# Patient Record
Sex: MALE | Race: WHITE | Employment: OTHER | URBAN - NONMETROPOLITAN AREA
[De-identification: names, ages, dates, MRNs, and addresses within clinical notes are randomized per-mention and may not be internally consistent; named-entity substitution may affect disease eponyms.]

---

## 2017-02-06 ENCOUNTER — MYC MEDICAL ADVICE (OUTPATIENT)
Dept: FAMILY MEDICINE | Facility: OTHER | Age: 77
End: 2017-02-06

## 2017-02-20 DIAGNOSIS — F41.9 ANXIETY: ICD-10-CM

## 2017-02-20 RX ORDER — CLONAZEPAM 0.5 MG/1
TABLET ORAL
Qty: 90 TABLET | Refills: 0 | Status: SHIPPED | OUTPATIENT
Start: 2017-02-20 | End: 2017-05-18

## 2017-02-20 NOTE — TELEPHONE ENCOUNTER
Pts wife sent the following message via [place].     Dr. Craig,    Would you please request Clonazepam .5 mg tab for Mookie through [employer].    We are leaving for vacation on March 3rd & the prescription doesn't have a refill until March 1st.    It takes about a week to get here.    Thank you,  Sadie Henderson CMA ([facility])  2/20/2017

## 2017-02-22 ENCOUNTER — MYC MEDICAL ADVICE (OUTPATIENT)
Dept: FAMILY MEDICINE | Facility: OTHER | Age: 77
End: 2017-02-22

## 2017-05-18 DIAGNOSIS — I25.799: ICD-10-CM

## 2017-05-18 DIAGNOSIS — E78.5 HYPERLIPIDEMIA LDL GOAL <100: ICD-10-CM

## 2017-05-18 DIAGNOSIS — Z95.1 POSTSURGICAL AORTOCORONARY BYPASS STATUS: ICD-10-CM

## 2017-05-18 DIAGNOSIS — E11.8 TYPE 2 DIABETES MELLITUS WITH COMPLICATION, UNSPECIFIED LONG TERM INSULIN USE STATUS: ICD-10-CM

## 2017-05-18 DIAGNOSIS — E11.65 TYPE 2 DIABETES MELLITUS WITH HYPERGLYCEMIA, WITHOUT LONG-TERM CURRENT USE OF INSULIN (H): ICD-10-CM

## 2017-05-18 DIAGNOSIS — E11.8 TYPE 2 DIABETES MELLITUS WITH COMPLICATION, WITHOUT LONG-TERM CURRENT USE OF INSULIN (H): ICD-10-CM

## 2017-05-18 DIAGNOSIS — F41.9 ANXIETY: ICD-10-CM

## 2017-05-18 RX ORDER — CLONAZEPAM 0.5 MG/1
TABLET ORAL
Qty: 90 TABLET | Refills: 0 | Status: SHIPPED | OUTPATIENT
Start: 2017-05-18 | End: 2017-09-05

## 2017-05-18 RX ORDER — METOPROLOL TARTRATE 25 MG/1
TABLET, FILM COATED ORAL
Qty: 180 TABLET | Refills: 1 | Status: SHIPPED | OUTPATIENT
Start: 2017-05-18 | End: 2017-11-29

## 2017-05-18 RX ORDER — GLIPIZIDE 5 MG/1
TABLET ORAL
Qty: 90 TABLET | Refills: 1 | Status: SHIPPED | OUTPATIENT
Start: 2017-05-18 | End: 2017-10-05

## 2017-05-18 RX ORDER — SIMVASTATIN 20 MG
TABLET ORAL
Qty: 90 TABLET | Refills: 1 | Status: SHIPPED | OUTPATIENT
Start: 2017-05-18 | End: 2017-11-29

## 2017-05-18 NOTE — TELEPHONE ENCOUNTER
Clonazepam,       Last Written Prescription Date:  2/20/17  Last Fill Quantity: 90,   # refills: 0  Last Office Visit with [facility], [facility] or [facility] prescribing provider: 12/2/16  Future Office visit:    Next 5 appointments (look out 90 days)     Jun 09, 2017  9:40 AM CDT   [facility]    [address] [facility] [address]   [phone]                   Routing refill request to provider for review/approval because:  Drug not on the [facility], [facility] or [facility] refill protocol or controlled substance      Metformin,       Last Written Prescription Date: 12/2/16  Last Fill Quantity: 180, # refills: 1  Last Office Visit with [facility], [facility] or [facility] prescribing provider:  12/2/16   Next 5 appointments (look out 90 days)     Jun 09, 2017  9:40 AM CDT   [facility]   [facility]    [address] [facility] [address]   [phone]                   BP Readings from Last 3 Encounters:   12/02/16 130/62   01/06/16 132/66   11/16/15 124/70     Lab Results   Component Value Date    MICROL 52 11/17/2016     Lab Results   Component Value Date    UMALCR 26.15 11/17/2016     Creatinine   Date Value Ref Range Status   12/29/2015 1.15 mg/dL Final   ]  GFR Estimate   Date Value Ref Range Status   07/30/2015 60 (L) >60 mL/min/1.7m2 Final     Comment:     Non  GFR Calc   09/11/2014 69 >60 mL/min/1.7m2 Final     Comment:     Non  GFR Calc   11/20/2013 67 >60 mL/min/1.7m2 Final     GFR Estimate If Black   Date Value Ref Range Status   07/30/2015 73 >60 mL/min/1.7m2 Final     Comment:      GFR Calc   09/11/2014 83 >60 mL/min/1.7m2 Final     Comment:      GFR Calc   11/20/2013 81 >60 mL/min/1.7m2 Final     Lab Results   Component Value Date    CHOL 156 11/17/2016     Lab Results   Component Value Date    HDL 31 11/17/2016     Lab Results    Component Value Date    LDL 47 11/17/2016     Lab Results   Component Value Date    TRIG 389 11/17/2016     Lab Results   Component Value Date    CHOLHDLRATIO 4.7 09/21/2015     Lab Results   Component Value Date    AST 31 12/29/2015     Lab Results   Component Value Date    ALT 22 12/29/2015     Lab Results   Component Value Date    A1C 6.6 11/17/2016    A1C 7.4 01/06/2016    A1C 6.4 07/30/2015    A1C 8.3 04/08/2015    A1C 7.4 09/11/2014     Potassium   Date Value Ref Range Status   12/29/2015 4.2 mmol/L Final          Metoprolol,      Last Written Prescription Date: 12/2/16  Last Fill Quantity: 180, # refills: 1    Last Office Visit [place] [facility] provider:  12/2/16   Future Office Visit:    Next 5 appointments (look out 90 days)     Jun 09, 2017  9:40 AM CDT   Office Visit with Kasi Craig MD   [facility])    [address] [facility] [address]   [phone]                    BP Readings from Last 3 Encounters:   12/02/16 130/62   01/06/16 132/66   11/16/15 124/70       Simvastatin,     Last Written Prescription Date: 12/2/16  Last Fill Quantity: 90, # refills: 1  Last Office Visit [facility] provider: 12/2/16  Next 5 appointments (look out 90 days)     Jun 09, 2017  9:40 AM CDT   [facility]    [address] [facility] [address]   [phone]                   Lab Results   Component Value Date    CHOL 156 11/17/2016     Lab Results   Component Value Date    HDL 31 11/17/2016     Lab Results   Component Value Date    LDL 47 11/17/2016     Lab Results   Component Value Date    TRIG 389 11/17/2016     Lab Results   Component Value Date    CHOLHDLRATIO 4.7 09/21/2015       Glipizide             Last Written Prescription Date: 12/2/16  Last Fill Quantity: 90, # refills: 1  Last Office Visit with [facility]  provider:  12/2/16   Next 5 appointments (look out 90 days)     Jun 09, 2017  9:40 AM CDT   Office Visit with Kasi Craig MD   [facility]    [address] [facility] [address] [phone]                   BP Readings from Last 3 Encounters:   12/02/16 130/62   01/06/16 132/66   11/16/15 124/70     Lab Results   Component Value Date    MICROL 52 11/17/2016     Lab Results   Component Value Date    UMALCR 26.15 11/17/2016     Creatinine   Date Value Ref Range Status   12/29/2015 1.15 mg/dL Final   ]  GFR Estimate   Date Value Ref Range Status   07/30/2015 60 (L) >60 mL/min/1.7m2 Final     Comment:     Non  GFR Calc   09/11/2014 69 >60 mL/min/1.7m2 Final     Comment:     Non  GFR Calc   11/20/2013 67 >60 mL/min/1.7m2 Final     GFR Estimate If Black   Date Value Ref Range Status   07/30/2015 73 >60 mL/min/1.7m2 Final     Comment:      GFR Calc   09/11/2014 83 >60 mL/min/1.7m2 Final     Comment:      GFR Calc   11/20/2013 81 >60 mL/min/1.7m2 Final     Lab Results   Component Value Date    CHOL 156 11/17/2016     Lab Results   Component Value Date    HDL 31 11/17/2016     Lab Results   Component Value Date    LDL 47 11/17/2016     Lab Results   Component Value Date    TRIG 389 11/17/2016     Lab Results   Component Value Date    CHOLHDLRATIO 4.7 09/21/2015     Lab Results   Component Value Date    AST 31 12/29/2015     Lab Results   Component Value Date    ALT 22 12/29/2015     Lab Results   Component Value Date    A1C 6.6 11/17/2016    A1C 7.4 01/06/2016    A1C 6.4 07/30/2015    A1C 8.3 04/08/2015    A1C 7.4 09/11/2014     Potassium   Date Value Ref Range Status   12/29/2015 4.2 mmol/L Final

## 2017-05-18 NOTE — TELEPHONE ENCOUNTER
These were signed yesterday.   zocor     Last Written Prescription Date: 5/18/17  Last Fill Quantity: , # refills:   Last Office Visit with [facility] provider:   Next 5 appointments (look out 90 days)     Jun 09, 2017  9:40 AM CDT   Office Visit with Kasi Craig MD   [facility]    [address] [facility] [id number] [phone]                   Lab Results   Component Value Date    CHOL 156 11/17/2016     Lab Results   Component Value Date    HDL 31 11/17/2016     Lab Results   Component Value Date    LDL 47 11/17/2016     Lab Results   Component Value Date    TRIG 389 11/17/2016     Lab Results   Component Value Date    CHOLHDLRATIO 4.7 09/21/2015       metformin         Last Written Prescription Date:   Last Fill Quantity: , # refills:   Last Office Visit with [facility] or  [facility] prescribing provider:     Next 5 appointments (look out 90 days)     Jun 09, 2017  9:40 AM CDT   Office Visit with Kasi Craig MD   [facility]    [address] [facility] [id number] [phone]                   BP Readings from Last 3 Encounters:   12/02/16 130/62   01/06/16 132/66   11/16/15 124/70     Lab Results   Component Value Date    MICROL 52 11/17/2016     Lab Results   Component Value Date    UMALCR 26.15 11/17/2016     Creatinine   Date Value Ref Range Status   12/29/2015 1.15 mg/dL Final   ]  GFR Estimate   Date Value Ref Range Status   07/30/2015 60 (L) >60 mL/min/1.7m2 Final     Comment:     Non  GFR Calc   09/11/2014 69 >60 mL/min/1.7m2 Final     Comment:     Non  GFR Calc   11/20/2013 67 >60 mL/min/1.7m2 Final     GFR Estimate If Black   Date Value Ref Range Status   07/30/2015 73 >60 mL/min/1.7m2 Final     Comment:      GFR Calc   09/11/2014 83 >60 mL/min/1.7m2 Final     Comment:      GFR Calc   11/20/2013 81 >60 mL/min/1.7m2 Final      Lab Results   Component Value Date    CHOL 156 11/17/2016     Lab Results   Component Value Date    HDL 31 11/17/2016     Lab Results   Component Value Date    LDL 47 11/17/2016     Lab Results   Component Value Date    TRIG 389 11/17/2016     Lab Results   Component Value Date    CHOLHDLRATIO 4.7 09/21/2015     Lab Results   Component Value Date    AST 31 12/29/2015     Lab Results   Component Value Date    ALT 22 12/29/2015     Lab Results   Component Value Date    A1C 6.6 11/17/2016    A1C 7.4 01/06/2016    A1C 6.4 07/30/2015    A1C 8.3 04/08/2015    A1C 7.4 09/11/2014     Potassium   Date Value Ref Range Status   12/29/2015 4.2 mmol/L Final     metoprolol      Last Written Prescription Date:   Last Fill Quantity: , # refills:     Last Office Visit with [facility], [facility] or  Health prescribing provider:     Future Office Visit:    Next 5 appointments (look out 90 days)     Jun 09, 2017  9:40 AM CDT   Office Visit with Kasi Craig MD   [facility]    [address] [facility] [address]   [phone]                    BP Readings from Last 3 Encounters:   12/02/16 130/62   01/06/16 132/66   11/16/15 124/70     glipizide         Last Written Prescription Date:   Last Fill Quantity: , # refills:   Last Office Visit with [facility], [facility] or [facility] prescribing provider:     Next 5 appointments (look out 90 days)     Jun 09, 2017  9:40 AM CDT   Office Visit with Kasi Craig MD   [facility]    [address] [facility] [address]   [phone]                   BP Readings from Last 3 Encounters:   12/02/16 130/62   01/06/16 132/66   11/16/15 124/70     Lab Results   Component Value Date    MICROL 52 11/17/2016     Lab Results   Component Value Date    UMALCR 26.15 11/17/2016     Creatinine   Date Value Ref Range Status   12/29/2015 1.15 mg/dL Final   ]  GFR Estimate   Date Value Ref Range Status   07/30/2015 60 (L) >60 mL/min/1.7m2  Final     Comment:     Non  GFR Calc   09/11/2014 69 >60 mL/min/1.7m2 Final     Comment:     Non  GFR Calc   11/20/2013 67 >60 mL/min/1.7m2 Final     GFR Estimate If Black   Date Value Ref Range Status   07/30/2015 73 >60 mL/min/1.7m2 Final     Comment:      GFR Calc   09/11/2014 83 >60 mL/min/1.7m2 Final     Comment:      GFR Calc   11/20/2013 81 >60 mL/min/1.7m2 Final     Lab Results   Component Value Date    CHOL 156 11/17/2016     Lab Results   Component Value Date    HDL 31 11/17/2016     Lab Results   Component Value Date    LDL 47 11/17/2016     Lab Results   Component Value Date    TRIG 389 11/17/2016     Lab Results   Component Value Date    CHOLHDLRATIO 4.7 09/21/2015     Lab Results   Component Value Date    AST 31 12/29/2015     Lab Results   Component Value Date    ALT 22 12/29/2015     Lab Results   Component Value Date    A1C 6.6 11/17/2016    A1C 7.4 01/06/2016    A1C 6.4 07/30/2015    A1C 8.3 04/08/2015    A1C 7.4 09/11/2014     Potassium   Date Value Ref Range Status   12/29/2015 4.2 mmol/L Final

## 2017-05-19 RX ORDER — SIMVASTATIN 20 MG
TABLET ORAL
Qty: 90 TABLET | Refills: 1 | OUTPATIENT
Start: 2017-05-19

## 2017-05-19 RX ORDER — METOPROLOL TARTRATE 25 MG/1
TABLET, FILM COATED ORAL
Qty: 180 TABLET | Refills: 1 | OUTPATIENT
Start: 2017-05-19

## 2017-05-19 RX ORDER — GLIPIZIDE 5 MG/1
TABLET ORAL
Qty: 90 TABLET | Refills: 1 | OUTPATIENT
Start: 2017-05-19

## 2017-05-19 NOTE — TELEPHONE ENCOUNTER
Prescription was sent 5/18/17 for #3 months with 1 refills.  Pharmacy notified via E-Prescribe refusal.     Shania Mckoy RN  [facility]

## 2017-06-09 ENCOUNTER — OFFICE VISIT (OUTPATIENT)
Dept: FAMILY MEDICINE | Facility: OTHER | Age: 77
End: 2017-06-09
Payer: COMMERCIAL

## 2017-06-09 VITALS
DIASTOLIC BLOOD PRESSURE: 72 MMHG | TEMPERATURE: 96.7 F | BODY MASS INDEX: 25.41 KG/M2 | RESPIRATION RATE: 18 BRPM | OXYGEN SATURATION: 98 % | HEART RATE: 62 BPM | SYSTOLIC BLOOD PRESSURE: 132 MMHG | WEIGHT: 181.5 LBS | HEIGHT: 71 IN

## 2017-06-09 DIAGNOSIS — E11.8 TYPE 2 DIABETES MELLITUS WITH COMPLICATION, WITHOUT LONG-TERM CURRENT USE OF INSULIN (H): Primary | ICD-10-CM

## 2017-06-09 DIAGNOSIS — M76.30 IT BAND SYNDROME, UNSPECIFIED LATERALITY: ICD-10-CM

## 2017-06-09 LAB
CREAT SERPL-MCNC: 1.06 MG/DL (ref 0.66–1.25)
GFR SERPL CREATININE-BSD FRML MDRD: 68 ML/MIN/1.7M2
HBA1C MFR BLD: 7.1 % (ref 4.3–6)

## 2017-06-09 PROCEDURE — 99214 OFFICE O/P EST MOD 30 MIN: CPT | Performed by: FAMILY MEDICINE

## 2017-06-09 PROCEDURE — 82565 ASSAY OF CREATININE: CPT | Performed by: FAMILY MEDICINE

## 2017-06-09 PROCEDURE — 36415 COLL VENOUS BLD VENIPUNCTURE: CPT | Performed by: FAMILY MEDICINE

## 2017-06-09 PROCEDURE — 83036 HEMOGLOBIN GLYCOSYLATED A1C: CPT | Performed by: FAMILY MEDICINE

## 2017-06-09 ASSESSMENT — PAIN SCALES - GENERAL: PAINLEVEL: MILD PAIN (3)

## 2017-06-09 NOTE — PROGRESS NOTES
SUBJECTIVE:                                                    Parker Ricks is a 76 year old male who presents to clinic today for the following health issues:          Diabetes Follow-up      Patient is checking blood sugars: not at all    Diabetic concerns: None     Symptoms of hypoglycemia (low blood sugar): none     Paresthesias (numbness or burning in feet) or sores: No     Date of last diabetic eye exam: 2016     Hyperlipidemia Follow-Up      Rate your low fat/cholesterol diet?: good    Taking statin?  Yes, no muscle aches from statin    Other lipid medications/supplements?:  none     Hypertension Follow-up      Outpatient blood pressures are not being checked.    Low Salt Diet: not monitoring salt         Amount of exercise or physical activity: 6-7 days/week for an average of 15-30 minutes    Problems taking medications regularly: No    Medication side effects: none    Diet: regular (no restrictions)      Musculoskeletal problem/pain      Duration: months    Description  Location: bilateral lateral hip pain    Intensity:  mild    Accompanying signs and symptoms: none    History  Previous similar problem: no   Previous evaluation:  none    Precipitating or alleviating factors:  Trauma or overuse: no   Aggravating factors include: walking    Therapies tried and outcome: nothing       Problem list and histories reviewed & adjusted, as indicated.  Additional history: as documented    Patient Active Problem List   Diagnosis     Essential and other specified forms of tremor     Orchitis and epididymitis     Coronary atherosclerosis     History of colonic polyps     Diverticulosis of large intestine     Type 2 diabetes mellitus with complication (H)     Hyperlipidemia LDL goal <100     Anxiety     Hypertension goal BP (blood pressure) < 140/90     Advanced directives, counseling/discussion     Tobacco abuse     Gastroesophageal reflux disease without esophagitis     Coronary atherosclerosis     Acute  posthemorrhagic anemia     Dementia without behavioral disturbance     Myocardial infarction, nontransmural (H)     Postsurgical aortocoronary bypass status     Other secondary thrombocytopenia     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)     Past Surgical History:   Procedure Laterality Date     C NONSPECIFIC PROCEDURE      Angioplasty with stent placement     HC COLONOSCOPY W BIOPSY  03/03/10    repeat in 5 yrs     HC COLONOSCOPY W/WO BRUSH/WASH      Colonoscopy with polypectomy       Social History   Substance Use Topics     Smoking status: Current Every Day Smoker     Packs/day: 0.30     Years: 40.00     Types: Cigarettes     Smokeless tobacco: Never Used      Comment: 3 cigarettes a day     Alcohol use No     Family History   Problem Relation Age of Onset     GASTROINTESTINAL DISEASE Mother      hiatal hernia     Neurologic Disorder Mother      tremor     Arthritis Father      HEART DISEASE Father      father  at age 84 of a MI that occurred while having knee replacements     GASTROINTESTINAL DISEASE Brother      CANCER Sister      sister  of ovarian cancer in her 60's         Current Outpatient Prescriptions   Medication Sig Dispense Refill     glipiZIDE (GLUCOTROL) 5 MG tablet TAKE 1 TABLET EVERY DAY 90 tablet 1     simvastatin (ZOCOR) 20 MG tablet TAKE 1 TABLET AT BEDTIME 90 tablet 1     metoprolol (LOPRESSOR) 25 MG tablet TAKE 1 TABLET TWICE DAILY 180 tablet 1     metFORMIN (GLUCOPHAGE) 1000 MG tablet TAKE 1 TABLET TWICE DAILY WITH MEALS 180 tablet 1     clonazePAM (KLONOPIN) 0.5 MG tablet TAKE 1 TABLET EVERY DAY 90 tablet 0     donepezil (ARICEPT) 5 MG tablet        memantine (NAMENDA) 10 MG tablet        Multiple vitamin TABS        aspirin EC 81 MG tablet Take 1 tablet (81 mg) by mouth daily       B Complex Vitamins (VITAMIN-B COMPLEX) TABS Take 1 tablet by mouth daily       calcium carbonate-vitamin D (CALCIUM + D) 600-200 MG-UNIT TABS Take 1 tablet  by mouth daily. 60 tablet      ACE NOT PRESCRIBED, INTENTIONAL, by Other route continuous prn.  0     MULTIVITAMINS OR TABS   1 tab daily       blood glucose monitoring (ACCU-CHEK COMPACT CARE KIT) meter device kit Use to test blood sugars 3 times daily or as directed. (Patient not taking: Reported on 6/9/2017) 1 kit 0     Blood Glucose Monitoring Suppl (ACCU-CHEK COMPLETE) KIT 1 Device 2 times daily (Patient not taking: Reported on 6/9/2017) 1 Device 0     blood glucose monitoring (ACCU-CHEK YVETTE) test strip test twice a day or as directed and lancets (Patient not taking: Reported on 6/9/2017) 3 Box 3     blood glucose monitoring (ACCU-CHEK FASTCLIX) lancets Use to test blood sugar 2 times daily or as directed. (Patient not taking: Reported on 6/9/2017) 3 Box 3     calcium carbonate antacid (PX ANTACID MAXIMUM STRENGTH) 1000 MG CHEW        ranitidine (ZANTAC) 150 MG tablet Take 1 tablet (150 mg) by mouth 2 times daily (Patient not taking: Reported on 6/9/2017) 60 tablet 1     Allergies   Allergen Reactions     No Known Drug Allergies      Recent Labs   Lab Test  06/09/17   0943  11/17/16   0937  01/06/16   1505 12/29/15  09/21/15   0857  07/30/15   0850   09/11/14   0851   01/20/12   0858   02/17/11   1150   A1C  7.1*  6.6*  7.4*   --    --   6.4*   < >  7.4*   < >  7.0*   < >  6.9*   LDL   --   47   --    --   50   --    --   40   < >  61   --   71   HDL   --   31*   --    --   34*   --    --   36*   < >  34*   --   29*   TRIG   --   389*   --    --   385*   --    --   303*   < >  284*   --   177*   ALT   --    --    --   22   --    --    --    --    --   26   --   24   CR   --    --    --   1.15   --   1.18   < >  1.05   < >  1.00   --   0.95   GFRESTIMATED   --    --    --    --    --   60*   --   69   < >  73   --   78   GFRESTBLACK   --    --    --    --    --   73   --   83   < >  89   --   >90   POTASSIUM   --    --    --   4.2   --   4.6   < >  4.3   < >  4.8   --   4.5   TSH   --   2.24   --    --    --    " --    --   1.41   < >   --    --    --     < > = values in this interval not displayed.      BP Readings from Last 3 Encounters:   06/09/17 132/72   12/02/16 130/62   01/06/16 132/66    Wt Readings from Last 3 Encounters:   06/09/17 181 lb 8 oz (82.3 kg)   12/02/16 184 lb 12.8 oz (83.8 kg)   01/06/16 184 lb (83.5 kg)                    Reviewed and updated as needed this visit by clinical staff  Tobacco  Allergies  Med Hx  Surg Hx  Fam Hx  Soc Hx      Reviewed and updated as needed this visit by Provider         ROS:  C: NEGATIVE for fever, chills, change in weight  E/M: NEGATIVE for ear, mouth and throat problems  R: NEGATIVE for significant cough or SOB  CV: NEGATIVE for chest pain, palpitations or peripheral edema  MUSCULOSKELETAL: POSITIVE  for joint stiffness bilateral hips  ROS otherwise negative    OBJECTIVE:                                                    /72 (BP Location: Left arm, Patient Position: Chair, Cuff Size: Adult Large)  Pulse 62  Temp 96.7  F (35.9  C) (Temporal)  Resp 18  Ht 5' 10.7\" (1.796 m)  Wt 181 lb 8 oz (82.3 kg)  SpO2 98%  BMI 25.53 kg/m2  Body mass index is 25.53 kg/(m^2).  GENERAL: healthy, alert and no distress  NECK: no adenopathy, no asymmetry, masses, or scars and thyroid normal to palpation  RESP: lungs clear to auscultation - no rales, rhonchi or wheezes  CV: regular rate and rhythm, normal S1 S2, no S3 or S4, no murmur, click or rub, no peripheral edema and peripheral pulses strong  ABDOMEN: soft, nontender, no hepatosplenomegaly, no masses and bowel sounds normal  MS: HIP EXAM: -bilaterally normal; full range of motion, no pain on motion, no effusion, tenderness, masses, contracture or deformity noted. No discomfort with internal or ext rotation.  EXTREMITIES: No palpable pain. ROM limited in effected hip with internal rotation. Tightness of ITB noted. Mild lateral/gluteal pain. Good pulses. No edema. Normal reflexes.      Diagnostic Test Results:  Results " for orders placed or performed in visit on 06/09/17 (from the past 24 hour(s))   Hemoglobin A1c   Result Value Ref Range    Hemoglobin A1C 7.1 (H) 4.3 - 6.0 %        ASSESSMENT/PLAN:                                                    Diabetes Type II, A1c Controlled, non-insulin dependent   Associated with the following complications    Renal Complications:  CKD    Ophthalmologic Complications: None    Neurologic Complications: None    Peripheral Vascular Complications:  Atherosclerosis of the extremities, unspecified    Other: None   Plan:  No changes in the patient's current treatment plan      Tobacco Cessation:   reports that he has been smoking Cigarettes.  He has a 12.00 pack-year smoking history. He has never used smokeless tobacco.  Tobacco Cessation Action Plan: Information offered: Patient not interested at this time      2. IT band syndrome, unspecified laterality  Chronic. Will refer to PT for HEP.  - PHYSICAL THERAPY REFERRAL    FUTURE LABS:       - Schedule fasting labs in 6 months  FUTURE APPOINTMENTS:       - Follow-up visit in 6 months.  Work on weight loss  Regular exercise    Kasi Craig MD  [facility]

## 2017-06-09 NOTE — MR AVS SNAPSHOT
After Visit Summary   6/9/2017    Parker Ricks    MRN: [id number]           Patient Information     Date Of Birth          1940        Visit Information        Provider Department      6/9/2017 9:40 AM Kasi Craig MD [facility]        Today's Diagnoses     Type 2 diabetes mellitus with complication, without long-term current use of insulin (H)    -  1    IT band syndrome, unspecified laterality           Follow-ups after your visit        Additional Services     PHYSICAL THERAPY REFERRAL       *This order will print in the [facility] Central Scheduling Office*    [facility] provides Physical Therapy evaluation and treatment and many specialty services across the [place] system.  If requesting a specialty program, please choose from the list below.    Call one number to schedule at any [facility] location   [phone].    Treatment: Evaluation & Treatment  Special Instructions/Modalities:   Special Programs: None    Please be aware that coverage of these services is subject to the terms and limitations of your health insurance plan.  Call member services at your health plan with any benefit or coverage questions.      **Note to Provider** To refer patients to therapy outside of the location list, change the order class to External Referral in the order composer.                  Follow-up notes from your care team     Return in about 6 months (around 12/9/2017) for Routine Visit.      Who to contact     If you have questions or need follow up information about today's clinic visit or your schedule please contact [facility] directly at [phone].  Normal or non-critical lab and imaging results will be communicated to you by MyChart, letter or phone within 4 business days after the clinic has received the results. If you do not hear from us within 7 days, please contact the clinic through  "MyChart or phone. If you have a critical or abnormal lab result, we will notify you by phone as soon as possible.  Submit refill requests through [employer] or call your pharmacy and they will forward the refill request to us. Please allow 3 business days for your refill to be completed.          Additional Information About Your Visit        [employer] Information     [employer] gives you secure access to your electronic health record. If you see a primary care provider, you can also send messages to your care team and make appointments. If you have questions, please call your primary care clinic.  If you do not have a primary care provider, please call [phone] and they will assist you.        Care EveryWhere ID     This is your Care EveryWhere ID. This could be used by other organizations to access your [place] medical records  [id number]        Your Vitals Were     Pulse Temperature Respirations Height Pulse Oximetry BMI (Body Mass Index)    62 96.7  F (35.9  C) (Temporal) 18 5' 10.7\" (1.796 m) 98% 25.53 kg/m2       Blood Pressure from Last 3 Encounters:   06/09/17 132/72   12/02/16 130/62   01/06/16 132/66    Weight from Last 3 Encounters:   06/09/17 181 lb 8 oz (82.3 kg)   12/02/16 184 lb 12.8 oz (83.8 kg)   01/06/16 184 lb (83.5 kg)              We Performed the Following     Creatinine     Hemoglobin A1c     PHYSICAL THERAPY REFERRAL        Primary Care Provider Office Phone # Fax #    Kasi Craig [phone]       [facility] [address] [facility] [address]        Thank you!     Thank you for choosing [facility]  for your care. Our goal is always to provide you with excellent care. Hearing back from our patients is one way we can continue to improve our services. Please take a few minutes to complete the written survey that you may receive in the mail after your visit with us. Thank you!             Your Updated Medication List - Protect others around you: Learn how " to safely use, store and throw away your medicines at www.disposemymeds.org.          This list is accurate as of: 6/9/17 10:12 AM.  Always use your most recent med list.                   Brand Name Dispense Instructions for use    * ACCU-CHEK COMPLETE Kit     1 Device    1 Device 2 times daily       * blood glucose monitoring meter device kit     1 kit    Use to test blood sugars 3 times daily or as directed.       ACE NOT PRESCRIBED (INTENTIONAL)      by Other route continuous prn.       aspirin EC 81 MG EC tablet      Take 1 tablet (81 mg) by mouth daily       blood glucose monitoring lancets     3 Box    Use to test blood sugar 2 times daily or as directed.       blood glucose monitoring test strip    ACCU-CHEK YVETTE    3 Box    test twice a day or as directed and lancets       calcium + D 600-200 MG-UNIT Tabs   Generic drug:  calcium carbonate-vitamin D     60 tablet    Take 1 tablet by mouth daily.       clonazePAM 0.5 MG tablet    klonoPIN    90 tablet    TAKE 1 TABLET EVERY DAY       donepezil 5 MG tablet    ARIcept         glipiZIDE 5 MG tablet    GLUCOTROL    90 tablet    TAKE 1 TABLET EVERY DAY       memantine 10 MG tablet    NAMENDA         metFORMIN 1000 MG tablet    GLUCOPHAGE    180 tablet    TAKE 1 TABLET TWICE DAILY WITH MEALS       metoprolol 25 MG tablet    LOPRESSOR    180 tablet    TAKE 1 TABLET TWICE DAILY       * Multiple vitamin Tabs          * multivitamin per tablet      1 tab daily       PX ANTACID MAXIMUM STRENGTH 1000 MG Chew   Generic drug:  calcium carbonate antacid          ranitidine 150 MG tablet    ZANTAC    60 tablet    Take 1 tablet (150 mg) by mouth 2 times daily       simvastatin 20 MG tablet    ZOCOR    90 tablet    TAKE 1 TABLET AT BEDTIME       Vitamin-B Complex Tabs      Take 1 tablet by mouth daily       * Notice:  This list has 4 medication(s) that are the same as other medications prescribed for you. Read the directions carefully, and ask your doctor or other care  provider to review them with you.

## 2017-06-09 NOTE — NURSING NOTE
"Chief Complaint   Patient presents with     Recheck Medication       Initial /72 (BP Location: Left arm, Patient Position: Chair, Cuff Size: Adult Large)  Pulse 62  Temp 96.7  F (35.9  C) (Temporal)  Resp 18  Ht 5' 10.7\" (1.796 m)  Wt 181 lb 8 oz (82.3 kg)  SpO2 98%  BMI 25.53 kg/m2 Estimated body mass index is 25.53 kg/(m^2) as calculated from the following:    Height as of this encounter: 5' 10.7\" (1.796 m).    Weight as of this encounter: 181 lb 8 oz (82.3 kg).  Medication Reconciliation: complete     Ayana Reed MA 6/9/2017  9:33 AM          "

## 2017-08-05 ENCOUNTER — MYC MEDICAL ADVICE (OUTPATIENT)
Dept: FAMILY MEDICINE | Facility: OTHER | Age: 77
End: 2017-08-05

## 2017-08-05 DIAGNOSIS — K21.9 GASTROESOPHAGEAL REFLUX DISEASE WITHOUT ESOPHAGITIS: ICD-10-CM

## 2017-08-07 NOTE — TELEPHONE ENCOUNTER
Zantac      Last Written Prescription Date: 7/13/15  Last Fill Quantity: 60,  # refills: 1   Last Office Visit with [facility], [facility] or [facility] prescribing provider: 6/9/17    Olivia John MA     8/7/2017

## 2017-08-24 ENCOUNTER — MYC MEDICAL ADVICE (OUTPATIENT)
Dept: FAMILY MEDICINE | Facility: OTHER | Age: 77
End: 2017-08-24

## 2017-08-25 ENCOUNTER — TELEPHONE (OUTPATIENT)
Dept: FAMILY MEDICINE | Facility: OTHER | Age: 77
End: 2017-08-25

## 2017-08-25 NOTE — TELEPHONE ENCOUNTER
RN called and spoke to the patient's wife. She said they just got off the phone with a different triage nurse (see encounter from today) and it was recommended the patient get checked out in the ED. She said they are headed to [facility] ED now. She will call with any questions or concerns.     Shania Mckoy RN  [facility]

## 2017-08-25 NOTE — TELEPHONE ENCOUNTER
Parker Ricks is a 76 year old male who calls with chest pain/pressure.    NURSING ASSESSMENT:  Description:  Patient and wife are calling to report he has had chest pain off and on for the past month.  He is reporting it is getting worse.  He states it feels like there is pressure in the middle of his chest.  They are reporting the Neurologist had listened to his heart with his stethoscope and had told them it wasn't his heart causing this pain, so they haven't done anything about it.   She is reporting he had major heart surgery in 2015.  Triglycerides at last reading were close to 400.  They are informed they need to go to an ED and have some heart tests to ensure this is not his heart, not from the neurologist.    Patient understands and agrees to this plan.    Onset/duration:  1 month  Precip. factors:  none  Associated symptoms:  See above  Improves/worsens symptoms:  worserning  Pain scale (0-10)   0/10 - did not rate - states it is pressure  Last exam/Treatment:  6/9/17  Allergies:   Allergies   Allergen Reactions     No Known Drug Allergies          NURSING PLAN: Nursing advice to patient to ED for further cardiac testing    RECOMMENDED DISPOSITION:  To ED, another person to drive   Will comply with recommendation: Yes  If further questions/concerns or if symptoms do not improve, worsen or new symptoms develop, call your PCP or [facility] as soon as possible.      Guideline used:  Chest Pain  Telephone Triage Protocols for Nurses, Fifth Edition, Odette Coreas RN

## 2017-08-28 ENCOUNTER — TELEPHONE (OUTPATIENT)
Dept: FAMILY MEDICINE | Facility: OTHER | Age: 77
End: 2017-08-28

## 2017-08-28 DIAGNOSIS — R07.9 CHEST PAIN, UNSPECIFIED TYPE: Primary | ICD-10-CM

## 2017-08-28 NOTE — TELEPHONE ENCOUNTER
Dr. Craig, do you want to put this pt in a Dr. Only or same day slot?  ................Heriberto Petty LPN,   August 28, 2017,      10:20 AM,   [facility]

## 2017-08-28 NOTE — TELEPHONE ENCOUNTER
Reason for Call:  Same Day Appointment, Requested Provider:  Kasi Craig M.D.    PCP: Kasi Craig    Reason for visit: Patient was in the Hospital over night for chest pain and heart issues. Heart was ruled out and was told to follow up with Dr. Craig in 3 days.     Duration of symptoms: patients wife didn't say    Have you been treated for this in the past? No    Additional comments: please call patients wife to get something scheduled this week     Can we leave a detailed message on this number? YES    Phone number patient can be reached at: Home number on file [phone] (home)    Best Time: any    Call taken on 8/28/2017 at 8:35 AM by Norma Winn

## 2017-08-29 NOTE — TELEPHONE ENCOUNTER
OK for workin next available Same Day or  Only appointment. Please call patient  to schedule time.  Electronically signed by Kasi Craig MD

## 2017-08-30 ENCOUNTER — OFFICE VISIT (OUTPATIENT)
Dept: FAMILY MEDICINE | Facility: OTHER | Age: 77
End: 2017-08-30
Payer: COMMERCIAL

## 2017-08-30 VITALS
RESPIRATION RATE: 16 BRPM | TEMPERATURE: 96.9 F | WEIGHT: 183.5 LBS | SYSTOLIC BLOOD PRESSURE: 126 MMHG | HEART RATE: 70 BPM | DIASTOLIC BLOOD PRESSURE: 72 MMHG | BODY MASS INDEX: 25.81 KG/M2 | OXYGEN SATURATION: 95 %

## 2017-08-30 DIAGNOSIS — F02.80 LATE ONSET ALZHEIMER'S DISEASE WITHOUT BEHAVIORAL DISTURBANCE (H): ICD-10-CM

## 2017-08-30 DIAGNOSIS — I10 HYPERTENSION GOAL BP (BLOOD PRESSURE) < 140/90: ICD-10-CM

## 2017-08-30 DIAGNOSIS — G30.1 LATE ONSET ALZHEIMER'S DISEASE WITHOUT BEHAVIORAL DISTURBANCE (H): ICD-10-CM

## 2017-08-30 DIAGNOSIS — E78.5 HYPERLIPIDEMIA LDL GOAL <100: ICD-10-CM

## 2017-08-30 DIAGNOSIS — I21.4 MYOCARDIAL INFARCTION, NONTRANSMURAL (H): ICD-10-CM

## 2017-08-30 DIAGNOSIS — E11.8 TYPE 2 DIABETES MELLITUS WITH COMPLICATION, WITHOUT LONG-TERM CURRENT USE OF INSULIN (H): Primary | ICD-10-CM

## 2017-08-30 PROCEDURE — 99495 TRANSJ CARE MGMT MOD F2F 14D: CPT | Performed by: FAMILY MEDICINE

## 2017-08-30 RX ORDER — DONEPEZIL HYDROCHLORIDE 10 MG/1
10 TABLET, FILM COATED ORAL AT BEDTIME
COMMUNITY
Start: 2017-08-22 | End: 2018-02-09

## 2017-08-30 ASSESSMENT — PAIN SCALES - GENERAL: PAINLEVEL: NO PAIN (0)

## 2017-08-30 NOTE — PROGRESS NOTES
SUBJECTIVE:   Parker Ricks is a 76 year old male who presents to clinic today for the following health issues:      Hospital Follow-up Visit:    Hospital/Nursing Home/IP Rehab Facility: [facility]  Date of Admission: 08/25/2017  Date of Discharge: 08/26/2017  Reason(s) for Admission: Chest Pain            Problems taking medications regularly:  None       Medication changes since discharge: Donepezil 10mg       Problems adhering to non-medication therapy:  None, just sweating due to increase on donepezil.   Still having chest pain and sweating  Summary of hospitalization:  Discharge summary unavailable  Diagnostic Tests/Treatments reviewed.  Follow up needed: Stress ECHO scheduled 9/6/17.  Other Healthcare Providers Involved in Patient s Care:         None  Update since discharge: stable. Still complains of intermittent epigastric pain. Non exertional.    Post Discharge Medication Reconciliation: discharge medications reconciled, continue medications without change.  Plan of care communicated with patient            Diabetes Follow-up      Patient is checking blood sugars: rarely.  Results range from 150    Diabetic concerns: None     Symptoms of hypoglycemia (low blood sugar): none     Paresthesias (numbness or burning in feet) or sores: No     Date of last diabetic eye exam: < 1year    Hyperlipidemia Follow-Up      Rate your low fat/cholesterol diet?: good    Taking statin?  Yes, no muscle aches from statin    Other lipid medications/supplements?:  none    Hypertension Follow-up      Outpatient blood pressures are not being checked.    Low Salt Diet: no added salt    Vascular Disease Follow-up:  Coronary Artery Disease (CAD)      Chest pain or pressure, left side neck or arm pain: Yes as above.    Shortness of breath/increased sweats/nausea with exertion: No    Pain in calves walking 1-2 blocks: No    Worsened or new symptoms since last visit: Yes ED follow up with normal serial troponin    Nitroglycerin  use: no    Daily aspirin use: Yes            Problem list and histories reviewed & adjusted, as indicated.  Additional history: as documented    Patient Active Problem List   Diagnosis     Essential and other specified forms of tremor     Orchitis and epididymitis     Coronary atherosclerosis     History of colonic polyps     Diverticulosis of large intestine     Type 2 diabetes mellitus with complication (H)     Hyperlipidemia LDL goal <100     Anxiety     Hypertension goal BP (blood pressure) < 140/90     Advanced directives, counseling/discussion     Tobacco abuse     Gastroesophageal reflux disease without esophagitis     Coronary atherosclerosis     Acute posthemorrhagic anemia     Dementia without behavioral disturbance     Myocardial infarction, nontransmural (H)     Postsurgical aortocoronary bypass status     Other secondary thrombocytopenia     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)     Late onset Alzheimer's disease without behavioral disturbance     Past Surgical History:   Procedure Laterality Date     C NONSPECIFIC PROCEDURE      Angioplasty with stent placement     HC COLONOSCOPY W BIOPSY  03/03/10    repeat in 5 yrs      COLONOSCOPY W/WO BRUSH/WASH      Colonoscopy with polypectomy       Social History   Substance Use Topics     Smoking status: Current Every Day Smoker     Packs/day: 0.30     Years: 40.00     Types: Cigarettes     Smokeless tobacco: Never Used      Comment: 3 cigarettes a day     Alcohol use No     Family History   Problem Relation Age of Onset     GASTROINTESTINAL DISEASE Mother      hiatal hernia     Neurologic Disorder Mother      tremor     Arthritis Father      HEART DISEASE Father      father  at age 84 of a MI that occurred while having knee replacements     GASTROINTESTINAL DISEASE Brother      CANCER Sister      sister  of ovarian cancer in her 60's         Current Outpatient Prescriptions   Medication Sig Dispense  Refill     donepezil (ARICEPT) 10 MG tablet Take 10 mg by mouth At Bedtime       ranitidine (ZANTAC) 150 MG tablet Take 1 tablet (150 mg) by mouth 2 times daily 60 tablet 1     glipiZIDE (GLUCOTROL) 5 MG tablet TAKE 1 TABLET EVERY DAY 90 tablet 1     simvastatin (ZOCOR) 20 MG tablet TAKE 1 TABLET AT BEDTIME 90 tablet 1     metoprolol (LOPRESSOR) 25 MG tablet TAKE 1 TABLET TWICE DAILY 180 tablet 1     metFORMIN (GLUCOPHAGE) 1000 MG tablet TAKE 1 TABLET TWICE DAILY WITH MEALS 180 tablet 1     clonazePAM (KLONOPIN) 0.5 MG tablet TAKE 1 TABLET EVERY DAY 90 tablet 0     memantine (NAMENDA) 10 MG tablet        calcium carbonate antacid (PX ANTACID MAXIMUM STRENGTH) 1000 MG CHEW        Multiple vitamin TABS        aspirin EC 81 MG tablet Take 1 tablet (81 mg) by mouth daily       B Complex Vitamins (VITAMIN-B COMPLEX) TABS Take 1 tablet by mouth daily       calcium carbonate-vitamin D (CALCIUM + D) 600-200 MG-UNIT TABS Take 1 tablet by mouth daily. 60 tablet      ACE NOT PRESCRIBED, INTENTIONAL, by Other route continuous prn.  0     MULTIVITAMINS OR TABS   1 tab daily       blood glucose monitoring (ACCU-CHEK COMPACT CARE KIT) meter device kit Use to test blood sugars 3 times daily or as directed. (Patient not taking: Reported on 6/9/2017) 1 kit 0     Blood Glucose Monitoring Suppl (ACCU-CHEK COMPLETE) KIT 1 Device 2 times daily (Patient not taking: Reported on 6/9/2017) 1 Device 0     blood glucose monitoring (ACCU-CHEK YVETTE) test strip test twice a day or as directed and lancets (Patient not taking: Reported on 6/9/2017) 3 Box 3     blood glucose monitoring (ACCU-CHEK FASTCLIX) lancets Use to test blood sugar 2 times daily or as directed. (Patient not taking: Reported on 6/9/2017) 3 Box 3     Allergies   Allergen Reactions     No Known Drug Allergies      Recent Labs   Lab Test  06/09/17   0943  11/17/16   0937  01/06/16   1505 12/29/15  09/21/15   0857  07/30/15   0850   09/11/14   0851   01/20/12   0858   02/17/11    1150   A1C  7.1*  6.6*  7.4*   --    --   6.4*   < >  7.4*   < >  7.0*   < >  6.9*   LDL   --   47   --    --   50   --    --   40   < >  61   --   71   HDL   --   31*   --    --   34*   --    --   36*   < >  34*   --   29*   TRIG   --   389*   --    --   385*   --    --   303*   < >  284*   --   177*   ALT   --    --    --   22   --    --    --    --    --   26   --   24   CR  1.06   --    --   1.15   --   1.18   < >  1.05   < >  1.00   --   0.95   GFRESTIMATED  68   --    --    --    --   60*   --   69   < >  73   --   78   GFRESTBLACK  82   --    --    --    --   73   --   83   < >  89   --   >90   POTASSIUM   --    --    --   4.2   --   4.6   < >  4.3   < >  4.8   --   4.5   TSH   --   2.24   --    --    --    --    --   1.41   < >   --    --    --     < > = values in this interval not displayed.      BP Readings from Last 3 Encounters:   08/30/17 126/72   06/09/17 132/72   12/02/16 130/62    Wt Readings from Last 3 Encounters:   08/30/17 183 lb 8 oz (83.2 kg)   06/09/17 181 lb 8 oz (82.3 kg)   12/02/16 184 lb 12.8 oz (83.8 kg)                          Reviewed and updated as needed this visit by clinical staffTobacco  Allergies  Meds  Problems  Med Hx  Surg Hx  Fam Hx  Soc Hx        Reviewed and updated as needed this visit by Provider  Allergies  Meds  Problems         ROS:  C: NEGATIVE for fever, chills, change in weight  E/M: NEGATIVE for ear, mouth and throat problems  R: NEGATIVE for significant cough or SOB  CV: POSITIVE for chest pain/chest pressure and NEGATIVE for cyanosis, diaphoresis, dyspnea on exertion, irregular heart beat, lower extremity edema, orthopnea, paroxysmal nocturnal dyspnea, syncope or near-syncope and tachycardia  GI: NEGATIVE for nausea, abdominal pain, heartburn, or change in bowel habits  ROS otherwise negative    OBJECTIVE:     /72 (BP Location: Left arm, Patient Position: Chair, Cuff Size: Adult Large)  Pulse 70  Temp 96.9  F (36.1  C) (Temporal)  Resp 16  Wt  183 lb 8 oz (83.2 kg)  SpO2 95%  BMI 25.81 kg/m2  Body mass index is 25.81 kg/(m^2).  GENERAL: healthy, alert and no distress  NECK: no adenopathy, no asymmetry, masses, or scars and thyroid normal to palpation  RESP: lungs clear to auscultation - no rales, rhonchi or wheezes  CV: regular rates and rhythm, normal S1 S2, no S3 or S4, grade 2/6 sytolic murmur heard best over the aortic distribution, peripheral pulses strong and no peripheral edema  ABDOMEN: soft, nontender, no hepatosplenomegaly, no masses and bowel sounds normal  MS: no gross musculoskeletal defects noted, no edema    Diagnostic Test Results:  none     ASSESSMENT/PLAN:     Diabetes Type II, A1c Controlled, non-insulin dependent   Associated with the following complications    Renal Complications:  CKD    Ophthalmologic Complications: None    Neurologic Complications: None    Peripheral Vascular Complications:  Atherosclerosis of the extremities, unspecified    Other: None   Plan:  No changes in the patient's current treatment plan    Hyperlipidemia; controlled   Plan:  No changes in the patient's current treatment plan    Hypertension; controlled   Associated with the following complications:    CKD, Heart Disease and Diabetes   Plan:  No changes in the patient's current treatment plan    Coronary Artery Disease (CAD); slightly worsened   Associated with the following complications:    Atherosclerosis of the extremities, unspecified   Plan:  Referrals/Other:  Stress ECHO next week.       FUTURE LABS:       - Schedule non-fasting labs in 3 months  FUTURE APPOINTMENTS:       - Follow-up visit in after Stress ECHO  SELF MONITORING:       - Please check blood glucose readings twice daily before meals       - Please check blood pressure readings daily  Work on weight loss  Regular exercise    Kasi Craig MD  [facility]

## 2017-08-30 NOTE — NURSING NOTE
"Chief Complaint   Patient presents with     RECHECK       Initial /72 (BP Location: Left arm, Patient Position: Chair, Cuff Size: Adult Large)  Pulse 70  Temp 96.9  F (36.1  C) (Temporal)  Resp 16  Wt 183 lb 8 oz (83.2 kg)  SpO2 95%  BMI 25.81 kg/m2 Estimated body mass index is 25.81 kg/(m^2) as calculated from the following:    Height as of 6/9/17: 5' 10.7\" (1.796 m).    Weight as of this encounter: 183 lb 8 oz (83.2 kg).  Medication Reconciliation: complete     Ayana Reed MA 8/30/2017  3:38 PM          "

## 2017-08-30 NOTE — MR AVS SNAPSHOT
After Visit Summary   8/30/2017    Parker Ricks    MRN: [id number]           Patient Information     Date Of Birth          1940        Visit Information        Provider Department      8/30/2017 3:10 PM Kasi Craig MD [facility]        Today's Diagnoses     Type 2 diabetes mellitus with complication, without long-term current use of insulin (H)    -  1    Hyperlipidemia LDL goal <100        Hypertension goal BP (blood pressure) < 140/90        Myocardial infarction, nontransmural (H)        Late onset Alzheimer's disease without behavioral disturbance           Follow-ups after your visit        Your next 10 appointments already scheduled     Sep 06, 2017 10:00 AM CDT   Ech Stress Test with PHECHR2   [facility] Echocardi[facility] ([facility])    [address] [place] [id number] [phone]           1. Please bring or wear a comfortable two-piece outfit and walking shoes. 2. Stop eating 3 hours before the test. You may drink water or juice. 3. Stop all caffeine 12 hours before the test. This includes coffee, tea, soda pop, chocolate and certain medicines (such as Anacin and Excederin). Also avoid decaf coffee and tea, as these contain small amounts of caffeine. 4. No alcohol, smoking or use of other tobacco products for 12 hours before the test. 5. Refer to your provider instructions to see if you need to stop any medications (such as beta-blockers or nitrates) for this test. 6. For patients with diabetes: - If you take insulin, call your diabetes care team. Ask if you should take a   dose the morning of your test. - If you take diabetes medicine by mouth, dont take it on the morning of your test. Bring it with you to take after the test. (If you have questions, call your diabetes care team) 7. When you arrive, please tell us if: - You have diabetes. - You have taken Viagra, Cialis or Levitra in the past 48 hours. 8. For any questions  that cannot be answered, please contact the ordering physician              Who to contact     If you have questions or need follow up information about today's clinic visit or your schedule please contact [facility] directly at [phone].  Normal or non-critical lab and imaging results will be communicated to you by MyChart, letter or phone within 4 business days after the clinic has received the results. If you do not hear from us within 7 days, please contact the clinic through MyChart or phone. If you have a critical or abnormal lab result, we will notify you by phone as soon as possible.  Submit refill requests through [employer] or call your pharmacy and they will forward the refill request to us. Please allow 3 business days for your refill to be completed.          Additional Information About Your Visit        [employer]ik[employer] Information     [employer] gives you secure access to your electronic health record. If you see a primary care provider, you can also send messages to your care team and make appointments. If you have questions, please call your primary care clinic.  If you do not have a primary care provider, please call [phone] and they will assist you.        Care EveryWhere ID     This is your Care EveryWhere ID. This could be used by other organizations to access your [place] medical records  [id number]        Your Vitals Were     Pulse Temperature Respirations Pulse Oximetry BMI (Body Mass Index)       70 96.9  F (36.1  C) (Temporal) 16 95% 25.81 kg/m2        Blood Pressure from Last 3 Encounters:   08/30/17 126/72   06/09/17 132/72   12/02/16 130/62    Weight from Last 3 Encounters:   08/30/17 183 lb 8 oz (83.2 kg)   06/09/17 181 lb 8 oz (82.3 kg)   12/02/16 184 lb 12.8 oz (83.8 kg)              Today, you had the following     No orders found for display       Primary Care Provider Office Phone # Fax #    Kasi Craig [phone]       [address] [facility]  [address]        Equal Access to Services     [facility]: Hadii marly emery navarro Cleaning, wajuanitoda luqadaha, qaybta kaalmasugey bunn, leandro del cid. So [facility] [phone].    ATENCIÓN: Si habla español, tiene a merino disposición servicios gratuitos de asistencia lingüística. Llame al [phone].    We comply with applicable federal civil rights laws and [place] laws. We do not discriminate on the basis of race, color, national origin, age, disability sex, sexual orientation or gender identity.            Thank you!     Thank you for choosing [facility]  for your care. Our goal is always to provide you with excellent care. Hearing back from our patients is one way we can continue to improve our services. Please take a few minutes to complete the written survey that you may receive in the mail after your visit with us. Thank you!             Your Updated Medication List - Protect others around you: Learn how to safely use, store and throw away your medicines at www.disposemymeds.org.          This list is accurate as of: 8/30/17  4:09 PM.  Always use your most recent med list.                   Brand Name Dispense Instructions for use Diagnosis    * ACCU-CHEK COMPLETE Kit     1 Device    1 Device 2 times daily    Type 2 diabetes mellitus with complication (H)       * blood glucose monitoring meter device kit     1 kit    Use to test blood sugars 3 times daily or as directed.    Type 2 diabetes mellitus with complication (H)       ACE NOT PRESCRIBED (INTENTIONAL)      by Other route continuous prn.    Type 2 diabetes, HbA1C goal < 8% (H)       aspirin EC 81 MG EC tablet      Take 1 tablet (81 mg) by mouth daily    Coronary atherosclerosis of unspecified type of vessel, native or graft, Type 2 diabetes, HbA1C goal < 8% (H)       blood glucose monitoring lancets     3 Box    Use to test blood sugar 2 times daily or as directed.    Type 2 diabetes, HbA1C goal < 8% (H)       blood  glucose monitoring test strip    ACCU-CHEK YVETTE    3 Box    test twice a day or as directed and lancets    Type 2 diabetes mellitus with complication (H)       calcium + D 600-200 MG-UNIT Tabs   Generic drug:  calcium carbonate-vitamin D     60 tablet    Take 1 tablet by mouth daily.    Calcium deficiency, Vitamin D deficiency       clonazePAM 0.5 MG tablet    klonoPIN    90 tablet    TAKE 1 TABLET EVERY DAY    Anxiety       donepezil 10 MG tablet    ARICEPT     Take 10 mg by mouth At Bedtime        glipiZIDE 5 MG tablet    GLUCOTROL    90 tablet    TAKE 1 TABLET EVERY DAY    Type 2 diabetes mellitus with hyperglycemia, without long-term current use of insulin (H)       memantine 10 MG tablet    NAMENDA          metFORMIN 1000 MG tablet    GLUCOPHAGE    180 tablet    TAKE 1 TABLET TWICE DAILY WITH MEALS    Type 2 diabetes mellitus with complication, unspecified long term insulin use status (H)       metoprolol 25 MG tablet    LOPRESSOR    180 tablet    TAKE 1 TABLET TWICE DAILY    Postsurgical aortocoronary bypass status       * Multiple vitamin Tabs           * multivitamin per tablet      1 tab daily        PX ANTACID MAXIMUM STRENGTH 1000 MG Chew   Generic drug:  calcium carbonate antacid           ranitidine 150 MG tablet    ZANTAC    60 tablet    Take 1 tablet (150 mg) by mouth 2 times daily    Gastroesophageal reflux disease without esophagitis       simvastatin 20 MG tablet    ZOCOR    90 tablet    TAKE 1 TABLET AT BEDTIME    Hyperlipidemia LDL goal <100, Type 2 diabetes mellitus with complication, without long-term current use of insulin (H), 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 Read the  directions carefully, and ask your doctor or other care provider to review them with you.

## 2017-09-05 DIAGNOSIS — K21.9 GASTROESOPHAGEAL REFLUX DISEASE WITHOUT ESOPHAGITIS: ICD-10-CM

## 2017-09-05 DIAGNOSIS — F41.9 ANXIETY: ICD-10-CM

## 2017-09-06 ENCOUNTER — HOSPITAL ENCOUNTER (OUTPATIENT)
Dept: CARDIOLOGY | Facility: CLINIC | Age: 77
Discharge: HOME | End: 2017-09-06
Admitting: INTERNAL MEDICINE
Payer: COMMERCIAL

## 2017-09-06 DIAGNOSIS — R07.9 CHEST PAIN, UNSPECIFIED TYPE: ICD-10-CM

## 2017-09-06 PROCEDURE — 25500064 ZZH RX 255 OP 636: Performed by: INTERNAL MEDICINE

## 2017-09-06 PROCEDURE — 93325 DOPPLER ECHO COLOR FLOW MAPG: CPT | Mod: 26 | Performed by: INTERNAL MEDICINE

## 2017-09-06 PROCEDURE — 93016 CV STRESS TEST SUPVJ ONLY: CPT | Performed by: INTERNAL MEDICINE

## 2017-09-06 PROCEDURE — 93321 DOPPLER ECHO F-UP/LMTD STD: CPT | Mod: 26 | Performed by: INTERNAL MEDICINE

## 2017-09-06 PROCEDURE — 40000264 ECHO STRESS WITH OPTISON

## 2017-09-06 PROCEDURE — 93018 CV STRESS TEST I&R ONLY: CPT | Performed by: INTERNAL MEDICINE

## 2017-09-06 PROCEDURE — 93017 CV STRESS TEST TRACING ONLY: CPT

## 2017-09-06 PROCEDURE — 93350 STRESS TTE ONLY: CPT | Mod: 26 | Performed by: INTERNAL MEDICINE

## 2017-09-06 RX ORDER — CLONAZEPAM 0.5 MG/1
TABLET ORAL
Qty: 90 TABLET | Refills: 0 | Status: SHIPPED | OUTPATIENT
Start: 2017-09-06 | End: 2018-01-09

## 2017-09-06 RX ADMIN — HUMAN ALBUMIN MICROSPHERES AND PERFLUTREN 3 ML: 10; .22 INJECTION, SOLUTION INTRAVENOUS at 10:53

## 2017-09-06 NOTE — TELEPHONE ENCOUNTER
Klonopin  Routing refill request to provider for review/approval because:  Drug not on the [facility] refill protocol     Zantac  Prescription approved per [facility] Refill Protocol.    Shania Mckoy RN  [facility]

## 2017-09-06 NOTE — TELEPHONE ENCOUNTER
Rx faxed and mailed to [facility].     Ayana Reed MA 9/6/2017  4:19 PM

## 2017-09-06 NOTE — TELEPHONE ENCOUNTER
Klonopin,   Last Written Prescription Date:  5/18/17  Last Fill Quantity: 90,   # refills: 0  Last Office Visit with [facility], [facility] or  Health prescribing provider: 8/30/17  Future Office visit:       Routing refill request to provider for review/approval because:  Drug not on the [facility], [facility] or  [facility] refill protocol or controlled substance      Zantac          Last Written Prescription Date: 8/7/17  Last Fill Quantity: 60,  # refills: 1   Last Office Visit with [facility], [facility] or  Health prescribing provider: 8/30/17

## 2017-09-15 ENCOUNTER — TELEPHONE (OUTPATIENT)
Dept: FAMILY MEDICINE | Facility: OTHER | Age: 77
End: 2017-09-15

## 2017-09-15 NOTE — TELEPHONE ENCOUNTER
Please call patient to triage symptoms.   OK to reduce to 5 mg.  Electronically signed by Kasi Craig MD

## 2017-09-15 NOTE — TELEPHONE ENCOUNTER
"Parker Ricks is a 76 year old male who calls with sweating.  Parker's wife is calling concerned that Parker seems to be sweating more profusely than before. Also his hand \"don't work as well. He seems more forgetful and just sits around. Is this how its going to get?\"    NURSING ASSESSMENT:  Description:  Increased sweating with activity, tired.    Onset/duration:  Worsening since increase aricept  Precip. factors:  Alzheimers  Associated symptoms:  \"his hands don't seem to work as well\". Parker had a echo done last week and it was good.  Last exam/Treatment:  Seen Neurologist on 8/22, will see again on 10/4th.   Allergies:   Allergies   Allergen Reactions     No Known Drug Allergies              RECOMMENDED DISPOSITION:  Home care advice - reduce aricept to 5mg, see Dr Craig note.    Recommended Sade should seek out a Alzheimer's support group for herself. Also gave her the jimenez and phone number for alzheimer association.  Will comply with recommendation: Yes  If further questions/concerns or if symptoms do not improve, worsen or new symptoms develop, call your PCP or [facility] as soon as possible.      Guideline used:  Telephone Triage Protocols for Nurses, Fifth Edition, Odette Sullivan RN    "

## 2017-10-05 DIAGNOSIS — E11.65 TYPE 2 DIABETES MELLITUS WITH HYPERGLYCEMIA, WITHOUT LONG-TERM CURRENT USE OF INSULIN (H): ICD-10-CM

## 2017-10-05 NOTE — TELEPHONE ENCOUNTER
Routing refill request to provider for review/approval because:  Labs out of range:  Microalbumin    Shania Mckoy, RN  [facility]

## 2017-10-05 NOTE — TELEPHONE ENCOUNTER
Glipizide         Last Written Prescription Date: 5/18/17  Last Fill Quantity: 90, # refills: 1  Last Office Visit with [facility], [facility] or [facility] prescribing provider:  8/30/17        BP Readings from Last 3 Encounters:   08/30/17 126/72   06/09/17 132/72   12/02/16 130/62     Lab Results   Component Value Date    MICROL 52 11/17/2016     Lab Results   Component Value Date    UMALCR 26.15 11/17/2016     Creatinine   Date Value Ref Range Status   06/09/2017 1.06 0.66 - 1.25 mg/dL Final   ]  GFR Estimate   Date Value Ref Range Status   06/09/2017 68 >60 mL/min/1.7m2 Final     Comment:     Non  GFR Calc   07/30/2015 60 (L) >60 mL/min/1.7m2 Final     Comment:     Non  GFR Calc   09/11/2014 69 >60 mL/min/1.7m2 Final     Comment:     Non  GFR Calc     GFR Estimate If Black   Date Value Ref Range Status   06/09/2017 82 >60 mL/min/1.7m2 Final     Comment:      GFR Calc   07/30/2015 73 >60 mL/min/1.7m2 Final     Comment:      GFR Calc   09/11/2014 83 >60 mL/min/1.7m2 Final     Comment:      GFR Calc     Lab Results   Component Value Date    CHOL 156 11/17/2016     Lab Results   Component Value Date    HDL 31 11/17/2016     Lab Results   Component Value Date    LDL 47 11/17/2016     Lab Results   Component Value Date    TRIG 389 11/17/2016     Lab Results   Component Value Date    CHOLHDLRATIO 4.7 09/21/2015     Lab Results   Component Value Date    AST 31 12/29/2015     Lab Results   Component Value Date    ALT 22 12/29/2015     Lab Results   Component Value Date    A1C 7.1 06/09/2017    A1C 6.6 11/17/2016    A1C 7.4 01/06/2016    A1C 6.4 07/30/2015    A1C 8.3 04/08/2015     Potassium   Date Value Ref Range Status   12/29/2015 4.2 mmol/L Final

## 2017-10-06 RX ORDER — GLIPIZIDE 5 MG/1
TABLET ORAL
Qty: 90 TABLET | Refills: 0 | Status: SHIPPED | OUTPATIENT
Start: 2017-10-06 | End: 2017-11-29

## 2017-11-29 DIAGNOSIS — I25.799: ICD-10-CM

## 2017-11-29 DIAGNOSIS — E11.8 TYPE 2 DIABETES MELLITUS WITH COMPLICATION, UNSPECIFIED LONG TERM INSULIN USE STATUS: ICD-10-CM

## 2017-11-29 DIAGNOSIS — E78.5 HYPERLIPIDEMIA LDL GOAL <100: ICD-10-CM

## 2017-11-29 DIAGNOSIS — Z95.1 POSTSURGICAL AORTOCORONARY BYPASS STATUS: ICD-10-CM

## 2017-11-29 DIAGNOSIS — E11.65 TYPE 2 DIABETES MELLITUS WITH HYPERGLYCEMIA, WITHOUT LONG-TERM CURRENT USE OF INSULIN (H): ICD-10-CM

## 2017-11-29 DIAGNOSIS — E11.8 TYPE 2 DIABETES MELLITUS WITH COMPLICATION, WITHOUT LONG-TERM CURRENT USE OF INSULIN (H): ICD-10-CM

## 2017-11-29 NOTE — TELEPHONE ENCOUNTER
Requested Prescriptions   Pending Prescriptions Disp Refills     simvastatin (ZOCOR) 20 MG tablet [Pharmacy Med Name: SIMVASTATIN 20 MG Tablet] 90 tablet 1     Sig: TAKE 1 TABLET AT BEDTIME    Statins Protocol Failed    11/29/2017 10:38 AM       Failed - LDL on file in past 12 months    Recent Labs   Lab Test  11/17/16   0937   LDL  47            Passed - No abnormal creatine kinase in past 12 months    No lab results found.         Passed - Recent or future visit with authorizing provider    Patient had office visit in the last year or has a visit in the next 30 days with authorizing provider.  See chart review.              Passed - Patient is age 18 or older        metFORMIN (GLUCOPHAGE) 1000 MG tablet [Pharmacy Med Name: METFORMIN HCL 1000 MG Tablet] 180 tablet 1     Sig: TAKE 1 TABLET TWICE DAILY WITH MEALS    Biguanide Agents Failed    11/29/2017 10:38 AM       Failed - Patient has documented LDL within the past 12 mos.    Recent Labs   Lab Test  11/17/16   0937   LDL  47            Failed - Patient has had a Microalbumin in the past 12 mos.    Recent Labs   Lab Test  11/17/16   0952   MICROL  52   UMALCR  26.15*            Passed - Patient's BP is less than 140/90    BP Readings from Last 3 Encounters:   08/30/17 126/72   06/09/17 132/72   12/02/16 130/62                Passed - Patient is age 10 or older       Passed - Patient has documented A1c within the specified period of time.    Recent Labs   Lab Test  06/09/17   0943   A1C  7.1*            Passed - Patient's CR is NOT>1.4 OR Patient's EGFR is NOT<45 within past 12 mos.    Recent Labs   Lab Test  06/09/17   0943   GFRESTIMATED  68   GFRESTBLACK  82       Recent Labs   Lab Test  06/09/17   0943   CR  1.06            Passed - Patient does NOT have a diagnosis of CHF.       Passed - Recent (6 mos) or future visit with authorizing provider's specialty    Patient had office visit in the last 6 months or has a visit in the next 30 days with authorizing  provider.  See chart review.             metoprolol (LOPRESSOR) 25 MG tablet [Pharmacy Med Name: METOPROLOL TARTRATE 25 MG Tablet] 180 tablet 1     Sig: TAKE 1 TABLET TWICE DAILY    Beta-Blockers Protocol Passed    11/29/2017 10:38 AM       Passed - Blood pressure under 140/90    BP Readings from Last 3 Encounters:   08/30/17 126/72   06/09/17 132/72   12/02/16 130/62                Passed - Patient is age 6 or older       Passed - Recent or future visit with authorizing provider's specialty    Patient had office visit in the last year or has a visit in the next 30 days with authorizing provider.  See chart review.               glipiZIDE (GLUCOTROL) 5 MG tablet [Pharmacy Med Name: GLIPIZIDE 5 MG Tablet] 90 tablet 0     Sig: TAKE 1 TABLET EVERY DAY    Sulfonylurea Agents Failed    11/29/2017 10:38 AM       Failed - Patient has documented LDL within the past 12 mos.    Recent Labs   Lab Test  11/17/16   0937   LDL  47            Failed - Patient has had a Microalbumin in the past 12 mos.    Recent Labs   Lab Test  11/17/16   0952   MICROL  52   UMALCR  26.15*            Passed - Patient's BP is less than 140/90    BP Readings from Last 3 Encounters:   08/30/17 126/72   06/09/17 132/72   12/02/16 130/62                Passed - Patient has documented A1c within the specified period of time.    Recent Labs   Lab Test  06/09/17   0943   A1C  7.1*            Passed - Patient is age 18 or older       Passed - Patient has a recent creatinine (normal) within the past 12 mos.    Recent Labs   Lab Test  06/09/17   0943   CR  1.06            Passed - Patient has had an appointment with authorizing provider within the past 6 mos. or  within next 30 days    Patient had office visit in the last 6 months or has a visit in the next 30 days with authorizing provider.  See chart review.

## 2017-11-30 RX ORDER — METOPROLOL TARTRATE 25 MG/1
TABLET, FILM COATED ORAL
Qty: 180 TABLET | Refills: 1 | Status: SHIPPED | OUTPATIENT
Start: 2017-11-30 | End: 2018-02-09

## 2017-11-30 RX ORDER — SIMVASTATIN 20 MG
TABLET ORAL
Qty: 90 TABLET | Refills: 1 | Status: SHIPPED | OUTPATIENT
Start: 2017-11-30 | End: 2018-02-09

## 2017-11-30 RX ORDER — GLIPIZIDE 5 MG/1
TABLET ORAL
Qty: 90 TABLET | Refills: 1 | Status: SHIPPED | OUTPATIENT
Start: 2017-11-30 | End: 2018-02-09

## 2017-11-30 NOTE — TELEPHONE ENCOUNTER
Routing refill request to provider for review/approval because:  Labs out of range:  Microalbumin  Labs not current:  LDL, Microalbumin    Shania Mckoy RN  [facility]

## 2018-01-09 DIAGNOSIS — F41.9 ANXIETY: ICD-10-CM

## 2018-01-09 RX ORDER — CLONAZEPAM 0.5 MG/1
TABLET ORAL
Qty: 90 TABLET | Refills: 0 | Status: SHIPPED | OUTPATIENT
Start: 2018-01-09 | End: 2018-06-06

## 2018-01-09 NOTE — TELEPHONE ENCOUNTER
Clonazepam 0.5 MG      Last Written Prescription Date:  9-6-17  Last Fill Quantity: 90,   # refills: 0  Last Office Visit: 8-30-17  Future Office visit:       Routing refill request to provider for review/approval because:  Drug not on the [facility], P or [facility] refill protocol or controlled substance

## 2018-02-09 ENCOUNTER — OFFICE VISIT (OUTPATIENT)
Dept: FAMILY MEDICINE | Facility: OTHER | Age: 78
End: 2018-02-09
Payer: COMMERCIAL

## 2018-02-09 VITALS
OXYGEN SATURATION: 97 % | WEIGHT: 190.6 LBS | TEMPERATURE: 97.8 F | SYSTOLIC BLOOD PRESSURE: 130 MMHG | RESPIRATION RATE: 16 BRPM | BODY MASS INDEX: 26.81 KG/M2 | HEART RATE: 64 BPM | DIASTOLIC BLOOD PRESSURE: 80 MMHG

## 2018-02-09 DIAGNOSIS — E11.8 TYPE 2 DIABETES MELLITUS WITH COMPLICATION, WITHOUT LONG-TERM CURRENT USE OF INSULIN (H): ICD-10-CM

## 2018-02-09 DIAGNOSIS — F41.9 ANXIETY: ICD-10-CM

## 2018-02-09 DIAGNOSIS — F02.80 LATE ONSET ALZHEIMER'S DISEASE WITHOUT BEHAVIORAL DISTURBANCE (H): Primary | ICD-10-CM

## 2018-02-09 DIAGNOSIS — I25.799: ICD-10-CM

## 2018-02-09 DIAGNOSIS — Z95.1 POSTSURGICAL AORTOCORONARY BYPASS STATUS: ICD-10-CM

## 2018-02-09 DIAGNOSIS — E78.5 HYPERLIPIDEMIA LDL GOAL <100: ICD-10-CM

## 2018-02-09 DIAGNOSIS — E11.65 TYPE 2 DIABETES MELLITUS WITH HYPERGLYCEMIA, WITHOUT LONG-TERM CURRENT USE OF INSULIN (H): ICD-10-CM

## 2018-02-09 DIAGNOSIS — G30.1 LATE ONSET ALZHEIMER'S DISEASE WITHOUT BEHAVIORAL DISTURBANCE (H): Primary | ICD-10-CM

## 2018-02-09 DIAGNOSIS — E11.8 TYPE 2 DIABETES MELLITUS WITH COMPLICATION, UNSPECIFIED LONG TERM INSULIN USE STATUS: ICD-10-CM

## 2018-02-09 LAB
CHOLEST SERPL-MCNC: 171 MG/DL
CREAT UR-MCNC: 169 MG/DL
HBA1C MFR BLD: 7.8 % (ref 4.3–6)
HDLC SERPL-MCNC: 32 MG/DL
LDLC SERPL CALC-MCNC: ABNORMAL MG/DL
LDLC SERPL DIRECT ASSAY-MCNC: 64 MG/DL
MICROALBUMIN UR-MCNC: 70 MG/L
MICROALBUMIN/CREAT UR: 41.6 MG/G CR (ref 0–17)
NONHDLC SERPL-MCNC: 139 MG/DL
TRIGL SERPL-MCNC: 670 MG/DL

## 2018-02-09 PROCEDURE — 83721 ASSAY OF BLOOD LIPOPROTEIN: CPT | Mod: XU | Performed by: FAMILY MEDICINE

## 2018-02-09 PROCEDURE — 80061 LIPID PANEL: CPT | Performed by: FAMILY MEDICINE

## 2018-02-09 PROCEDURE — 83036 HEMOGLOBIN GLYCOSYLATED A1C: CPT | Performed by: FAMILY MEDICINE

## 2018-02-09 PROCEDURE — 82043 UR ALBUMIN QUANTITATIVE: CPT | Performed by: FAMILY MEDICINE

## 2018-02-09 PROCEDURE — 36415 COLL VENOUS BLD VENIPUNCTURE: CPT | Performed by: FAMILY MEDICINE

## 2018-02-09 PROCEDURE — 99214 OFFICE O/P EST MOD 30 MIN: CPT | Performed by: FAMILY MEDICINE

## 2018-02-09 RX ORDER — SIMVASTATIN 20 MG
20 TABLET ORAL AT BEDTIME
Qty: 90 TABLET | Refills: 1 | Status: SHIPPED | OUTPATIENT
Start: 2018-02-09 | End: 2018-08-12

## 2018-02-09 RX ORDER — MEMANTINE HYDROCHLORIDE 10 MG/1
10 TABLET ORAL 2 TIMES DAILY
Qty: 180 TABLET | Refills: 1 | Status: SHIPPED | OUTPATIENT
Start: 2018-02-09 | End: 2018-08-12

## 2018-02-09 RX ORDER — CLONAZEPAM 0.5 MG/1
TABLET ORAL
Qty: 90 TABLET | Refills: 0 | Status: CANCELLED | OUTPATIENT
Start: 2018-02-09

## 2018-02-09 RX ORDER — DONEPEZIL HYDROCHLORIDE 10 MG/1
10 TABLET, FILM COATED ORAL AT BEDTIME
Qty: 90 TABLET | Refills: 1 | Status: SHIPPED | OUTPATIENT
Start: 2018-02-09 | End: 2018-10-05

## 2018-02-09 RX ORDER — METOPROLOL TARTRATE 25 MG/1
25 TABLET, FILM COATED ORAL 2 TIMES DAILY
Qty: 180 TABLET | Refills: 1 | Status: SHIPPED | OUTPATIENT
Start: 2018-02-09 | End: 2018-08-12

## 2018-02-09 RX ORDER — GLIPIZIDE 5 MG/1
TABLET ORAL
Qty: 90 TABLET | Refills: 1 | Status: SHIPPED | OUTPATIENT
Start: 2018-02-09 | End: 2018-08-12

## 2018-02-09 ASSESSMENT — PAIN SCALES - GENERAL: PAINLEVEL: NO PAIN (0)

## 2018-02-09 NOTE — PROGRESS NOTES
SUBJECTIVE:   Parker Ricks is a 77 year old male who presents to clinic today for the following health issues:        Diabetes Follow-up      Patient is checking blood sugars: not at all    Diabetic concerns: other - been eating a lot of sweets     Symptoms of hypoglycemia (low blood sugar): sweating     Paresthesias (numbness or burning in feet) or sores: No     Date of last diabetic eye exam: over a year ago    Hyperlipidemia Follow-Up      Rate your low fat/cholesterol diet?: not monitoring fat    Taking statin?  Yes, no muscle aches from statin    Other lipid medications/supplements?:  none    Hypertension Follow-up      Outpatient blood pressures are not being checked.    Low Salt Diet: not monitoring salt    BP Readings from Last 2 Encounters:   02/09/18 130/80   08/30/17 126/72     Hemoglobin A1C (%)   Date Value   02/09/2018 7.8 (H)   06/09/2017 7.1 (H)     LDL Cholesterol Calculated (mg/dL)   Date Value   11/17/2016 47   09/21/2015 50       Amount of exercise or physical activity: None    Problems taking medications regularly: No    Medication side effects: Sleepy    Diet: regular (no restrictions) try to eat healty    Concern: Been sitting around a lot more and tired. Wife stated concern. They are seeing geriatric psychologist in [place] for his dementia. Wife Lencho has concerns about options for medication if Mookie becomes agitated. She denies agitation at this time.            Problem list and histories reviewed & adjusted, as indicated.  Additional history: as documented    Patient Active Problem List   Diagnosis     Essential and other specified forms of tremor     Orchitis and epididymitis     Coronary atherosclerosis     History of colonic polyps     Diverticulosis of large intestine     Type 2 diabetes mellitus with complication (H)     Hyperlipidemia LDL goal <100     Anxiety     Hypertension goal BP (blood pressure) < 140/90     Advanced directives, counseling/discussion     Tobacco abuse      Gastroesophageal reflux disease without esophagitis     Coronary atherosclerosis     Acute posthemorrhagic anemia     Dementia without behavioral disturbance     Myocardial infarction, nontransmural (H)     Postsurgical aortocoronary bypass status     Other secondary thrombocytopenia     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)     Late onset Alzheimer's disease without behavioral disturbance     Past Surgical History:   Procedure Laterality Date     C NONSPECIFIC PROCEDURE      Angioplasty with stent placement     HC COLONOSCOPY W BIOPSY  03/03/10    repeat in 5 yrs     HC COLONOSCOPY W/WO BRUSH/WASH      Colonoscopy with polypectomy       Social History   Substance Use Topics     Smoking status: Current Every Day Smoker     Packs/day: 0.30     Years: 40.00     Types: Cigarettes     Smokeless tobacco: Never Used      Comment: 3 cigarettes a day     Alcohol use No     Family History   Problem Relation Age of Onset     GASTROINTESTINAL DISEASE Mother      hiatal hernia     Neurologic Disorder Mother      tremor     Arthritis Father      HEART DISEASE Father      father  at age 84 of a MI that occurred while having knee replacements     GASTROINTESTINAL DISEASE Brother      CANCER Sister      sister  of ovarian cancer in her 60's         Current Outpatient Prescriptions   Medication Sig Dispense Refill     clonazePAM (KLONOPIN) 0.5 MG tablet TAKE 1 TABLET EVERY DAY 90 tablet 0     simvastatin (ZOCOR) 20 MG tablet TAKE 1 TABLET AT BEDTIME 90 tablet 1     metFORMIN (GLUCOPHAGE) 1000 MG tablet TAKE 1 TABLET TWICE DAILY WITH MEALS 180 tablet 1     metoprolol (LOPRESSOR) 25 MG tablet TAKE 1 TABLET TWICE DAILY 180 tablet 1     glipiZIDE (GLUCOTROL) 5 MG tablet TAKE 1 TABLET EVERY DAY 90 tablet 1     donepezil (ARICEPT) 10 MG tablet Take 10 mg by mouth At Bedtime       memantine (NAMENDA) 10 MG tablet        aspirin EC 81 MG tablet Take 1 tablet (81 mg) by mouth daily        B Complex Vitamins (VITAMIN-B COMPLEX) TABS Take 1 tablet by mouth daily       calcium carbonate-vitamin D (CALCIUM + D) 600-200 MG-UNIT TABS Take 1 tablet by mouth daily. 60 tablet      ACE NOT PRESCRIBED, INTENTIONAL, by Other route continuous prn.  0     MULTIVITAMINS OR TABS   1 tab daily       ranitidine (ZANTAC) 150 MG tablet TAKE 1 TABLET TWICE DAILY (Patient not taking: Reported on 2/9/2018) 180 tablet 1     blood glucose monitoring (ACCU-CHEK COMPACT CARE KIT) meter device kit Use to test blood sugars 3 times daily or as directed. (Patient not taking: Reported on 6/9/2017) 1 kit 0     Blood Glucose Monitoring Suppl (ACCU-CHEK COMPLETE) KIT 1 Device 2 times daily (Patient not taking: Reported on 6/9/2017) 1 Device 0     blood glucose monitoring (ACCU-CHEK YVETTE) test strip test twice a day or as directed and lancets (Patient not taking: Reported on 6/9/2017) 3 Box 3     blood glucose monitoring (ACCU-CHEK FASTCLIX) lancets Use to test blood sugar 2 times daily or as directed. (Patient not taking: Reported on 6/9/2017) 3 Box 3     calcium carbonate antacid (PX ANTACID MAXIMUM STRENGTH) 1000 MG CHEW        Allergies   Allergen Reactions     No Known Drug Allergies      Recent Labs   Lab Test  02/09/18   1104  06/09/17   0943  11/17/16   0937  12/29/15  09/21/15   0857  07/30/15   0850   09/11/14   0851   01/20/12   0858   02/17/11   1150   A1C  7.8*  7.1*  6.6*   < >   --    --   6.4*   < >  7.4*   < >  7.0*   < >  6.9*   LDL   --    --   47   --    --   50   --    --   40   < >  61   --   71   HDL   --    --   31*   --    --   34*   --    --   36*   < >  34*   --   29*   TRIG   --    --   389*   --    --   385*   --    --   303*   < >  284*   --   177*   ALT   --    --    --    --   22   --    --    --    --    --   26   --   24   CR   --   1.06   --    --   1.15   --   1.18   < >  1.05   < >  1.00   --   0.95   GFRESTIMATED   --   68   --    --    --    --   60*   --   69   < >  73   --   78    GFRESTBLACK   --   82   --    --    --    --   73   --   83   < >  89   --   >90   POTASSIUM   --    --    --    --   4.2   --   4.6   < >  4.3   < >  4.8   --   4.5   TSH   --    --   2.24   --    --    --    --    --   1.41   < >   --    --    --     < > = values in this interval not displayed.      BP Readings from Last 3 Encounters:   02/09/18 130/80   08/30/17 126/72   06/09/17 132/72    Wt Readings from Last 3 Encounters:   02/09/18 190 lb 9.6 oz (86.5 kg)   08/30/17 183 lb 8 oz (83.2 kg)   06/09/17 181 lb 8 oz (82.3 kg)                  Labs reviewed in EPIC    Reviewed and updated as needed this visit by clinical staff  Tobacco  Allergies  Meds  Problems  Med Hx  Surg Hx  Fam Hx  Soc Hx        Reviewed and updated as needed this visit by Provider  Allergies  Meds  Problems         ROS:  C: NEGATIVE for fever, chills, change in weight  E/M: NEGATIVE for ear, mouth and throat problems  R: NEGATIVE for significant cough or SOB  CV: NEGATIVE for chest pain, palpitations or peripheral edema  PSYCHIATRIC: POSITIVE forfatigue and impaired memory and NEGATIVE for abusive relationship, agitation and delusions  ROS otherwise negative    OBJECTIVE:     /80 (BP Location: Left arm, Patient Position: Chair, Cuff Size: Adult Large)  Pulse 64  Temp 97.8  F (36.6  C) (Temporal)  Resp 16  Wt 190 lb 9.6 oz (86.5 kg)  SpO2 97%  BMI 26.81 kg/m2  Body mass index is 26.81 kg/(m^2).  GENERAL: healthy, alert, no distress and elderly  EYES: Eyes grossly normal to inspection, PERRL and conjunctivae and sclerae normal  HENT: ear canals and TM's normal, nose and mouth without ulcers or lesions  NECK: no adenopathy, no asymmetry, masses, or scars and thyroid normal to palpation  RESP: lungs clear to auscultation - no rales, rhonchi or wheezes  CV: regular rate and rhythm, normal S1 S2, no S3 or S4, no murmur, click or rub, no peripheral edema and peripheral pulses strong  ABDOMEN: soft, nontender, no  hepatosplenomegaly, no masses and bowel sounds normal  MS: no gross musculoskeletal defects noted, no edema  PSYCH: inattentive, affect normal/bright, judgement and insight impaired and appearance well groomed    Diagnostic Test Results:  Results for orders placed or performed in visit on 02/09/18   Lipid panel reflex to direct LDL Fasting   Result Value Ref Range    Cholesterol 171 <200 mg/dL    Triglycerides 670 (H) <150 mg/dL    HDL Cholesterol 32 (L) >39 mg/dL    LDL Cholesterol Calculated  <100 mg/dL     Cannot estimate LDL when triglyceride exceeds 400 mg/dL    Non HDL Cholesterol 139 (H) <130 mg/dL   Hemoglobin A1c   Result Value Ref Range    Hemoglobin A1C 7.8 (H) 4.3 - 6.0 %   Albumin Random Urine Quantitative with Creat Ratio   Result Value Ref Range    Creatinine Urine 169 mg/dL    Albumin Urine mg/L 70 mg/L    Albumin Urine mg/g Cr 41.60 (H) 0 - 17 mg/g Cr   LDL cholesterol direct   Result Value Ref Range    LDL Cholesterol Direct 64 <100 mg/dL       ASSESSMENT/PLAN:     1. Anxiety  Chronic related to progressive cognitive impairment. Overall doing well. The current medical regimen is effective;  continue present plan and medications. Follow up with psychologist for medication adjustment options.    2. Hyperlipidemia LDL goal <100  Chronic controlled on moderate dose statin. The current medical regimen is effective;  continue present plan and medications.   - simvastatin (ZOCOR) 20 MG tablet; Take 1 tablet (20 mg) by mouth At Bedtime  Dispense: 90 tablet; Refill: 1  - Lipid panel reflex to direct LDL Fasting    3. Type 2 diabetes mellitus with complication, without long-term current use of insulin (H)  Chronic controlled with associated ASCVD, HTN and hyperlipidemia. BS control variable. They rarely check BS and A1C up slightly. Will encourage closer dietary control and more regular exercise. The current medical regimen is effective;  continue present plan and medications. Recheck in 6 months.  -  simvastatin (ZOCOR) 20 MG tablet; Take 1 tablet (20 mg) by mouth At Bedtime  Dispense: 90 tablet; Refill: 1  - Hemoglobin A1c  - Albumin Random Urine Quantitative with Creat Ratio    4. 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 BP controlled, continues with adequate control of DM. Continues on moderate dose statin and ASA. Unfortunately he continues to smoke. This is unlikely to change due to patient progressive dementia. The current medical regimen is effective;  continue present plan and medications.   - simvastatin (ZOCOR) 20 MG tablet; Take 1 tablet (20 mg) by mouth At Bedtime  Dispense: 90 tablet; Refill: 1      8. Late onset Alzheimer's disease without behavioral disturbance  Chronic progressive moderate cognitive impairment. He seems to do well on Aricept and Namenda without limiting side effects. Wife notes he is more fatigued and less motivated in the past 6 months. He is more forgetful and at times more anxious. She has concerns that he may become agitated and angry. She feels he is safe at home with her at this time and she has the support that she needs. They have geriatric psychologist at [facility] in [place]. I recommend that they follow up there for at least every 6 month evaluation.  - donepezil (ARICEPT) 10 MG tablet; Take 1 tablet (10 mg) by mouth At Bedtime  Dispense: 90 tablet; Refill: 1  - memantine (NAMENDA) 10 MG tablet; Take 1 tablet (10 mg) by mouth 2 times daily  Dispense: 180 tablet; Refill: 1  - LDL cholesterol direct    FUTURE LABS:       - Schedule non-fasting labs in 6 months  FUTURE APPOINTMENTS:       - Follow-up visit in 6 months.       - Make appointment with geriatric specialist  Work on weight loss  Regular exercise    Kasi Craig MD  [facility]

## 2018-02-09 NOTE — MR AVS SNAPSHOT
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      Who to contact     If you have questions or need follow up information about today's clinic visit or your schedule please contact [facility] directly at [phone].  Normal or non-critical lab and imaging results will be communicated to you by [facility], letter or phone within 4 business days after the clinic has received the results. If you do not hear from us within 7 days, please contact the clinic through [facility] or phone. If you have a critical or abnormal lab result, we will notify you by phone as soon as possible.  Submit refill requests through [facility] or call your pharmacy and they will forward the refill request to us. Please allow 3 business days for your refill to be completed.          Additional Information About Your Visit        [facility] Information     [facility] gives you secure access to your electronic health record. If you see a primary care provider, you can also send messages to your care team and  make appointments. If you have questions, please call your primary care clinic.  If you do not have a primary care provider, please call [phone] and they will assist you.        Care EveryWhere ID     This is your Care EveryWhere ID. This could be used by other organizations to access your [place] medical records  [id number]        Your Vitals Were     Pulse Temperature Respirations Pulse Oximetry BMI (Body Mass Index)       64 97.8  F (36.6  C) (Temporal) 16 97% 26.81 kg/m2        Blood Pressure from Last 3 Encounters:   02/09/18 130/80   08/30/17 126/72   06/09/17 132/72    Weight from Last 3 Encounters:   02/09/18 190 lb 9.6 oz (86.5 kg)   08/30/17 183 lb 8 oz (83.2 kg)   06/09/17 181 lb 8 oz (82.3 kg)              We Performed the Following     Albumin Random Urine Quantitative with Creat Ratio     Hemoglobin A1c     Lipid panel reflex to direct LDL Fasting          Today's Medication Changes          These changes are accurate as of 2/9/18 11:39 AM.  If you have any questions, ask your nurse or doctor.               These medicines have changed or have updated prescriptions.        Dose/Directions    glipiZIDE 5 MG tablet   Commonly known as:  GLUCOTROL   This may have changed:  See the new instructions.   Used for:  Type 2 diabetes mellitus with hyperglycemia, without long-term current use of insulin (H)   Changed by:  Kasi Craig MD        TAKE 1 TABLET EVERY DAY   Quantity:  90 tablet   Refills:  1       memantine 10 MG tablet   Commonly known as:  NAMENDA   This may have changed:    - how much to take  - how to take this  - when to take this   Used for:  Late onset Alzheimer's disease without behavioral disturbance   Changed by:  Kasi Craig MD        Dose:  10 mg   Take 1 tablet (10 mg) by mouth 2 times daily   Quantity:  180 tablet   Refills:  1       metFORMIN 1000 MG tablet   Commonly known as:  GLUCOPHAGE   This may have changed:  See the new instructions.   Used for:  Type 2 diabetes  mellitus with complication, unspecified long term insulin use status (H)   Changed by:  Kasi Craig MD        TAKE 1 TABLET TWICE DAILY WITH MEALS   Quantity:  180 tablet   Refills:  1       metoprolol tartrate 25 MG tablet   Commonly known as:  LOPRESSOR   This may have changed:  See the new instructions.   Used for:  Postsurgical aortocoronary bypass status   Changed by:  Kasi Craig MD        Dose:  25 mg   Take 1 tablet (25 mg) by mouth 2 times daily   Quantity:  180 tablet   Refills:  1       simvastatin 20 MG tablet   Commonly known as:  ZOCOR   This may have changed:  See the new instructions.   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)   Changed by:  Kasi Craig MD        Dose:  20 mg   Take 1 tablet (20 mg) by mouth At Bedtime   Quantity:  90 tablet   Refills:  1            Where to get your medicines      These medications were sent to [facility] - [facility] [address]  [address], [facility] [address]     Phone:  [phone]     donepezil 10 MG tablet    glipiZIDE 5 MG tablet    memantine 10 MG tablet    metFORMIN 1000 MG tablet    metoprolol tartrate 25 MG tablet    simvastatin 20 MG tablet                Primary Care Provider Office Phone # Fax #    Kasi Craig [phone]       [address] [facility] [address]        Equal Access to Services     RHONDA MILAN : Hadii marly emery hadasho Soomaali, waaxda luqadaha, qaybta kaalmada oni, leandro del cid. So [facility] [phone].    ATENCIÓN: Si habla español, tiene a merino disposición servicios gratuitos de asistencia lingüística. Alo al [phone].    We comply with applicable federal civil rights laws and [place] laws. We do not discriminate on the basis of race, color, national origin, age,  disability, sex, sexual orientation, or gender identity.            Thank you!     Thank you for choosing [facility]  for your care. Our goal is always to provide you with excellent care. Hearing back from our patients is one way we can continue to improve our services. Please take a few minutes to complete the written survey that you may receive in the mail after your visit with us. Thank you!             Your Updated Medication List - Protect others around you: Learn how to safely use, store and throw away your medicines at www.disposemymeds.org.          This list is accurate as of 2/9/18 11:39 AM.  Always use your most recent med list.                   Brand Name Dispense Instructions for use Diagnosis    * ACCU-CHEK COMPLETE Kit     1 Device    1 Device 2 times daily    Type 2 diabetes mellitus with complication (H)       * blood glucose monitoring meter device kit     1 kit    Use to test blood sugars 3 times daily or as directed.    Type 2 diabetes mellitus with complication (H)       ACE NOT PRESCRIBED (INTENTIONAL)      by Other route continuous prn.    Type 2 diabetes, HbA1C goal < 8% (H)       aspirin EC 81 MG EC tablet      Take 1 tablet (81 mg) by mouth daily    Coronary atherosclerosis of unspecified type of vessel, native or graft, Type 2 diabetes, HbA1C goal < 8% (H)       blood glucose monitoring lancets     3 Box    Use to test blood sugar 2 times daily or as directed.    Type 2 diabetes, HbA1C goal < 8% (H)       blood glucose monitoring test strip    ACCU-CHEK YVETTE    3 Box    test twice a day or as directed and lancets    Type 2 diabetes mellitus with complication (H)       calcium + D 600-200 MG-UNIT Tabs   Generic drug:  calcium carbonate-vitamin D     60 tablet    Take 1 tablet by mouth daily.    Calcium deficiency, Vitamin D deficiency       clonazePAM 0.5 MG tablet    klonoPIN    90 tablet    TAKE 1 TABLET EVERY DAY    Anxiety       donepezil 10 MG tablet    ARICEPT    90  tablet    Take 1 tablet (10 mg) by mouth At Bedtime    Late onset Alzheimer's disease without behavioral disturbance       glipiZIDE 5 MG tablet    GLUCOTROL    90 tablet    TAKE 1 TABLET EVERY DAY    Type 2 diabetes mellitus with hyperglycemia, without long-term current use of insulin (H)       memantine 10 MG tablet    NAMENDA    180 tablet    Take 1 tablet (10 mg) by mouth 2 times daily    Late onset Alzheimer's disease without behavioral disturbance       metFORMIN 1000 MG tablet    GLUCOPHAGE    180 tablet    TAKE 1 TABLET TWICE DAILY WITH MEALS    Type 2 diabetes mellitus with complication, unspecified long term insulin use status (H)       metoprolol tartrate 25 MG tablet    LOPRESSOR    180 tablet    Take 1 tablet (25 mg) by mouth 2 times daily    Postsurgical aortocoronary bypass status       multivitamin per tablet      1 tab daily        PX ANTACID MAXIMUM STRENGTH 1000 MG Chew   Generic drug:  calcium carbonate antacid           ranitidine 150 MG tablet    ZANTAC    180 tablet    TAKE 1 TABLET TWICE DAILY    Gastroesophageal reflux disease without esophagitis       simvastatin 20 MG tablet    ZOCOR    90 tablet    Take 1 tablet (20 mg) by mouth At Bedtime    Hyperlipidemia LDL goal <100, Type 2 diabetes mellitus with complication, without long-term current use of insulin (H), 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 Read the directions carefully, and ask your doctor or other care provider to review them with you.

## 2018-02-09 NOTE — NURSING NOTE
"Chief Complaint   Patient presents with     Recheck Medication     Diabetes     Lipids     Hypertension       Initial /80 (BP Location: Left arm, Patient Position: Chair, Cuff Size: Adult Large)  Pulse 64  Temp 97.8  F (36.6  C) (Temporal)  Resp 16  Wt 190 lb 9.6 oz (86.5 kg)  SpO2 97%  BMI 26.81 kg/m2 Estimated body mass index is 26.81 kg/(m^2) as calculated from the following:    Height as of 6/9/17: 5' 10.7\" (1.796 m).    Weight as of this encounter: 190 lb 9.6 oz (86.5 kg).  Medication Reconciliation: complete     Ayana Reed MA 2/9/2018  10:51 AM          "

## 2018-04-11 ENCOUNTER — TRANSFERRED RECORDS (OUTPATIENT)
Dept: HEALTH INFORMATION MANAGEMENT | Facility: CLINIC | Age: 78
End: 2018-04-11

## 2018-06-06 DIAGNOSIS — F41.9 ANXIETY: ICD-10-CM

## 2018-06-06 RX ORDER — CLONAZEPAM 0.5 MG/1
TABLET ORAL
Qty: 90 TABLET | Refills: 0 | Status: SHIPPED | OUTPATIENT
Start: 2018-06-06 | End: 2018-08-12

## 2018-06-06 NOTE — TELEPHONE ENCOUNTER
Clonazepam 0.5 MG       Last Written Prescription Date:  1/9/18  Last Fill Quantity: 90,   # refills: 0  Last Office Visit: 2/9/18  Future Office visit:       Routing refill request to provider for review/approval because:  Drug not on the [facility], [facility] or [facility] refill protocol or controlled substance

## 2018-08-12 DIAGNOSIS — F41.9 ANXIETY: ICD-10-CM

## 2018-08-12 DIAGNOSIS — F02.80 LATE ONSET ALZHEIMER'S DISEASE WITHOUT BEHAVIORAL DISTURBANCE (H): ICD-10-CM

## 2018-08-12 DIAGNOSIS — E78.5 HYPERLIPIDEMIA LDL GOAL <100: ICD-10-CM

## 2018-08-12 DIAGNOSIS — G30.1 LATE ONSET ALZHEIMER'S DISEASE WITHOUT BEHAVIORAL DISTURBANCE (H): ICD-10-CM

## 2018-08-12 DIAGNOSIS — E11.65 TYPE 2 DIABETES MELLITUS WITH HYPERGLYCEMIA, WITHOUT LONG-TERM CURRENT USE OF INSULIN (H): ICD-10-CM

## 2018-08-12 DIAGNOSIS — Z95.1 POSTSURGICAL AORTOCORONARY BYPASS STATUS: ICD-10-CM

## 2018-08-12 DIAGNOSIS — I25.799: ICD-10-CM

## 2018-08-12 DIAGNOSIS — E11.8 TYPE 2 DIABETES MELLITUS WITH COMPLICATION, WITHOUT LONG-TERM CURRENT USE OF INSULIN (H): ICD-10-CM

## 2018-08-12 DIAGNOSIS — E11.8 TYPE 2 DIABETES MELLITUS WITH COMPLICATION, UNSPECIFIED LONG TERM INSULIN USE STATUS: ICD-10-CM

## 2018-08-14 RX ORDER — SIMVASTATIN 20 MG
TABLET ORAL
Qty: 90 TABLET | Refills: 0 | Status: SHIPPED | OUTPATIENT
Start: 2018-08-14 | End: 2018-10-05

## 2018-08-14 RX ORDER — CLONAZEPAM 0.5 MG/1
TABLET ORAL
Qty: 90 TABLET | Refills: 0 | Status: SHIPPED | OUTPATIENT
Start: 2018-08-14 | End: 2018-12-17

## 2018-08-14 RX ORDER — MEMANTINE HYDROCHLORIDE 10 MG/1
TABLET ORAL
Qty: 180 TABLET | Refills: 0 | Status: SHIPPED | OUTPATIENT
Start: 2018-08-14 | End: 2018-10-05

## 2018-08-14 RX ORDER — METOPROLOL TARTRATE 25 MG/1
TABLET, FILM COATED ORAL
Qty: 180 TABLET | Refills: 0 | Status: SHIPPED | OUTPATIENT
Start: 2018-08-14 | End: 2018-10-05

## 2018-08-14 RX ORDER — GLIPIZIDE 5 MG/1
TABLET ORAL
Qty: 90 TABLET | Refills: 0 | Status: SHIPPED | OUTPATIENT
Start: 2018-08-14 | End: 2018-10-05

## 2018-08-14 NOTE — TELEPHONE ENCOUNTER
"Requested Prescriptions   Pending Prescriptions Disp Refills     glipiZIDE (GLUCOTROL) 5 MG tablet [Pharmacy Med Name: GLIPIZIDE 5 MG Tablet] 90 tablet 1     Sig: TAKE 1 TABLET EVERY DAY    Sulfonylurea Agents Failed    8/12/2018  8:50 AM       Failed - Blood pressure less than 140/90 in past 6 months    BP Readings from Last 3 Encounters:   02/09/18 130/80   08/30/17 126/72   06/09/17 132/72                Failed - Patient has documented A1c within the specified period of time.    If HgbA1C is 8 or greater, it needs to be on file within the past 3 months.  If less than 8, must be on file within the past 6 months.     Recent Labs   Lab Test  02/09/18   1104   A1C  7.8*            Failed - Patient has a recent creatinine (normal) within the past 12 mos.    Recent Labs   Lab Test  06/09/17   0943   CR  1.06            Failed - Recent (6 mo) or future (30 days) visit within the authorizing provider's specialty    Patient had office visit in the last 6 months or has a visit in the next 30 days with authorizing provider or within the authorizing provider's specialty.  See \"Patient Info\" tab in inbasket, or \"Choose Columns\" in Meds & Orders section of the refill encounter.           Passed - Patient has documented LDL within the past 12 mos.    Recent Labs   Lab Test  02/09/18   1104   LDL  Cannot estimate LDL when triglyceride exceeds 400 mg/dL  64            Passed - Patient has had a Microalbumin in the past 12 mos.    Recent Labs   Lab Test  02/09/18   1100   MICROL  70   UMALCR  41.60*            Passed - Patient is age 18 or older        clonazePAM (KLONOPIN) 0.5 MG tablet [Pharmacy Med Name: CLONAZEPAM 0.5 MG Tablet] 90 tablet 0     Sig: TAKE 1 TABLET EVERY DAY    There is no refill protocol information for this order        memantine (NAMENDA) 10 MG tablet [Pharmacy Med Name: MEMANTINE HCL 10 MG Tablet] 180 tablet 1     Sig: TAKE 1 TABLET (10 MG) BY MOUTH 2 TIMES DAILY    Miscellaneous Dementia Agents Passed    " "8/12/2018  8:50 AM       Passed - Recent (12 mo) or future (30 days) visit within the authorizing provider's specialty    Patient had office visit in the last 12 months or has a visit in the next 30 days with authorizing provider or within the authorizing provider's specialty.  See \"Patient Info\" tab in inbasket, or \"Choose Columns\" in Meds & Orders section of the refill encounter.           Passed - Patient is 18 years of age or older        metoprolol tartrate (LOPRESSOR) 25 MG tablet [Pharmacy Med Name: METOPROLOL TARTRATE 25 MG Tablet] 180 tablet 1     Sig: TAKE 1 TABLET (25 MG) BY MOUTH 2 TIMES DAILY    Beta-Blockers Protocol Passed    8/12/2018  8:50 AM       Passed - Blood pressure under 140/90 in past 12 months    BP Readings from Last 3 Encounters:   02/09/18 130/80   08/30/17 126/72   06/09/17 132/72                Passed - Patient is age 6 or older       Passed - Recent (12 mo) or future (30 days) visit within the authorizing provider's specialty    Patient had office visit in the last 12 months or has a visit in the next 30 days with authorizing provider or within the authorizing provider's specialty.  See \"Patient Info\" tab in inbasket, or \"Choose Columns\" in Meds & Orders section of the refill encounter.            simvastatin (ZOCOR) 20 MG tablet [Pharmacy Med Name: SIMVASTATIN 20 MG Tablet] 90 tablet 1     Sig: TAKE 1 TABLET (20 MG) BY MOUTH AT BEDTIME    Statins Protocol Passed    8/12/2018  8:50 AM       Passed - LDL on file in past 12 months    Recent Labs   Lab Test  02/09/18   1104   LDL  Cannot estimate LDL when triglyceride exceeds 400 mg/dL  64            Passed - No abnormal creatine kinase in past 12 months    No lab results found.            Passed - Recent (12 mo) or future (30 days) visit within the authorizing provider's specialty    Patient had office visit in the last 12 months or has a visit in the next 30 days with authorizing provider or within the authorizing provider's " "specialty.  See \"Patient Info\" tab in inbasket, or \"Choose Columns\" in Meds & Orders section of the refill encounter.           Passed - Patient is age 18 or older        metFORMIN (GLUCOPHAGE) 1000 MG tablet [Pharmacy Med Name: METFORMIN HCL 1000 MG Tablet] 180 tablet 1     Sig: TAKE 1 TABLET TWICE DAILY WITH MEALS    Biguanide Agents Failed    8/12/2018  8:50 AM       Failed - Blood pressure less than 140/90 in past 6 months    BP Readings from Last 3 Encounters:   02/09/18 130/80   08/30/17 126/72   06/09/17 132/72                Failed - Patient has documented A1c within the specified period of time.    If HgbA1C is 8 or greater, it needs to be on file within the past 3 months.  If less than 8, must be on file within the past 6 months.     Recent Labs   Lab Test  02/09/18   1104   A1C  7.8*            Failed - Recent (6 mo) or future (30 days) visit within the authorizing provider's specialty    Patient had office visit in the last 6 months or has a visit in the next 30 days with authorizing provider or within the authorizing provider's specialty.  See \"Patient Info\" tab in inbasket, or \"Choose Columns\" in Meds & Orders section of the refill encounter.           Passed - Patient has documented LDL within the past 12 mos.    Recent Labs   Lab Test  02/09/18   1104   LDL  Cannot estimate LDL when triglyceride exceeds 400 mg/dL  64            Passed - Patient has had a Microalbumin in the past 12 mos.    Recent Labs   Lab Test  02/09/18   1100   MICROL  70   UMALCR  41.60*            Passed - Patient is age 10 or older       Passed - Patient's CR is NOT>1.4 OR Patient's EGFR is NOT<45 within past 12 mos.    Recent Labs   Lab Test  06/09/17   0943   GFRESTIMATED  68   GFRESTBLACK  82       Recent Labs   Lab Test  06/09/17   0943   CR  1.06            Passed - Patient does NOT have a diagnosis of CHF.        "

## 2018-08-14 NOTE — TELEPHONE ENCOUNTER
Routing refill request to provider for review/approval because:  Labs out of range:  LDL, Microalbumin  Labs not current:  BP, A1C, Creatinine    Klonopin      Last Written Prescription Date:  6/6/18  Last Fill Quantity: 90,   # refills: 0  Last Office Visit: 2/9/18  Future Office visit:       Routing refill request to provider for review/approval because:  Drug not on the [facility], [facility] or [facility] refill protocol or controlled substance

## 2018-09-10 ENCOUNTER — MYC MEDICAL ADVICE (OUTPATIENT)
Dept: FAMILY MEDICINE | Facility: OTHER | Age: 78
End: 2018-09-10

## 2018-10-05 ENCOUNTER — OFFICE VISIT (OUTPATIENT)
Dept: FAMILY MEDICINE | Facility: OTHER | Age: 78
End: 2018-10-05
Payer: COMMERCIAL

## 2018-10-05 VITALS
SYSTOLIC BLOOD PRESSURE: 120 MMHG | HEIGHT: 71 IN | HEART RATE: 64 BPM | OXYGEN SATURATION: 95 % | WEIGHT: 189.1 LBS | RESPIRATION RATE: 14 BRPM | BODY MASS INDEX: 26.47 KG/M2 | DIASTOLIC BLOOD PRESSURE: 64 MMHG | TEMPERATURE: 97 F

## 2018-10-05 DIAGNOSIS — Z23 NEED FOR PROPHYLACTIC VACCINATION AND INOCULATION AGAINST INFLUENZA: ICD-10-CM

## 2018-10-05 DIAGNOSIS — Z95.1 POSTSURGICAL AORTOCORONARY BYPASS STATUS: ICD-10-CM

## 2018-10-05 DIAGNOSIS — I25.799: ICD-10-CM

## 2018-10-05 DIAGNOSIS — E11.65 TYPE 2 DIABETES MELLITUS WITH HYPERGLYCEMIA, WITHOUT LONG-TERM CURRENT USE OF INSULIN (H): ICD-10-CM

## 2018-10-05 DIAGNOSIS — G30.1 LATE ONSET ALZHEIMER'S DISEASE WITHOUT BEHAVIORAL DISTURBANCE (H): ICD-10-CM

## 2018-10-05 DIAGNOSIS — E11.8 TYPE 2 DIABETES MELLITUS WITH COMPLICATION, WITHOUT LONG-TERM CURRENT USE OF INSULIN (H): Primary | ICD-10-CM

## 2018-10-05 DIAGNOSIS — I10 HYPERTENSION GOAL BP (BLOOD PRESSURE) < 140/90: ICD-10-CM

## 2018-10-05 DIAGNOSIS — F02.80 LATE ONSET ALZHEIMER'S DISEASE WITHOUT BEHAVIORAL DISTURBANCE (H): ICD-10-CM

## 2018-10-05 DIAGNOSIS — E78.5 HYPERLIPIDEMIA LDL GOAL <100: ICD-10-CM

## 2018-10-05 LAB
CREAT SERPL-MCNC: 1.11 MG/DL (ref 0.66–1.25)
GFR SERPL CREATININE-BSD FRML MDRD: 64 ML/MIN/1.7M2
HBA1C MFR BLD: 7.9 % (ref 0–5.6)

## 2018-10-05 PROCEDURE — 36415 COLL VENOUS BLD VENIPUNCTURE: CPT | Performed by: FAMILY MEDICINE

## 2018-10-05 PROCEDURE — 90662 IIV NO PRSV INCREASED AG IM: CPT | Performed by: FAMILY MEDICINE

## 2018-10-05 PROCEDURE — 99214 OFFICE O/P EST MOD 30 MIN: CPT | Mod: 25 | Performed by: FAMILY MEDICINE

## 2018-10-05 PROCEDURE — G0008 ADMIN INFLUENZA VIRUS VAC: HCPCS | Performed by: FAMILY MEDICINE

## 2018-10-05 PROCEDURE — 83036 HEMOGLOBIN GLYCOSYLATED A1C: CPT | Performed by: FAMILY MEDICINE

## 2018-10-05 PROCEDURE — 82565 ASSAY OF CREATININE: CPT | Performed by: FAMILY MEDICINE

## 2018-10-05 RX ORDER — SIMVASTATIN 20 MG
TABLET ORAL
Qty: 90 TABLET | Refills: 1 | Status: SHIPPED | OUTPATIENT
Start: 2018-10-05 | End: 2019-04-26

## 2018-10-05 RX ORDER — DONEPEZIL HYDROCHLORIDE 10 MG/1
10 TABLET, FILM COATED ORAL AT BEDTIME
Qty: 90 TABLET | Refills: 1 | Status: SHIPPED | OUTPATIENT
Start: 2018-10-05 | End: 2019-04-26

## 2018-10-05 RX ORDER — METOPROLOL TARTRATE 25 MG/1
TABLET, FILM COATED ORAL
Qty: 180 TABLET | Refills: 1 | Status: SHIPPED | OUTPATIENT
Start: 2018-10-05 | End: 2019-04-26

## 2018-10-05 RX ORDER — MEMANTINE HYDROCHLORIDE 10 MG/1
TABLET ORAL
Qty: 180 TABLET | Refills: 1 | Status: SHIPPED | OUTPATIENT
Start: 2018-10-05 | End: 2019-04-26

## 2018-10-05 RX ORDER — GLIPIZIDE 5 MG/1
TABLET ORAL
Qty: 90 TABLET | Refills: 1 | Status: SHIPPED | OUTPATIENT
Start: 2018-10-05 | End: 2019-04-26

## 2018-10-05 ASSESSMENT — PAIN SCALES - GENERAL: PAINLEVEL: NO PAIN (0)

## 2018-10-05 NOTE — PROGRESS NOTES
SUBJECTIVE:   Parker Ricks is a 78 year old male who presents to clinic today for the following health issues:        Diabetes Follow-up      Patient is checking blood sugars: not at all    Diabetic concerns: None     Symptoms of hypoglycemia (low blood sugar): none     Paresthesias (numbness or burning in feet) or sores: No     Date of last diabetic eye exam: Over a year ago    Diabetes Management Resources    Hyperlipidemia Follow-Up      Rate your low fat/cholesterol diet?: not monitoring fat    Taking statin?  Yes, no muscle aches from statin    Other lipid medications/supplements?:  none    Hypertension Follow-up      Outpatient blood pressures are not being checked.    Low Salt Diet: low salt    BP Readings from Last 2 Encounters:   10/05/18 120/64   02/09/18 130/80     Hemoglobin A1C (%)   Date Value   02/09/2018 7.8 (H)   06/09/2017 7.1 (H)     LDL Cholesterol Calculated (mg/dL)   Date Value   02/09/2018     Cannot estimate LDL when triglyceride exceeds 400 mg/dL   11/17/2016 47     LDL Cholesterol Direct (mg/dL)   Date Value   02/09/2018 64       Amount of exercise or physical activity: None    Problems taking medications regularly: No    Medication side effects: magnesium was getting loose stool     Diet: regular (no restrictions) and low salt. Eating healthy meals          Problem list and histories reviewed & adjusted, as indicated.  Additional history: as documented    Patient Active Problem List   Diagnosis     Essential and other specified forms of tremor     Orchitis and epididymitis     Coronary atherosclerosis     History of colonic polyps     Diverticulosis of large intestine     Type 2 diabetes mellitus with complication (H)     Hyperlipidemia LDL goal <100     Anxiety     Hypertension goal BP (blood pressure) < 140/90     Advanced directives, counseling/discussion     Tobacco abuse     Gastroesophageal reflux disease without esophagitis     Coronary atherosclerosis     Acute  posthemorrhagic anemia     Dementia without behavioral disturbance     Myocardial infarction, nontransmural (H)     Postsurgical aortocoronary bypass status     Other secondary thrombocytopenia     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)     Late onset Alzheimer's disease without behavioral disturbance     Past Surgical History:   Procedure Laterality Date     C NONSPECIFIC PROCEDURE      Angioplasty with stent placement     HC COLONOSCOPY W BIOPSY  03/03/10    repeat in 5 yrs     HC COLONOSCOPY W/WO BRUSH/WASH      Colonoscopy with polypectomy       Social History   Substance Use Topics     Smoking status: Current Every Day Smoker     Packs/day: 0.30     Years: 40.00     Types: Cigarettes     Smokeless tobacco: Never Used      Comment: 3 cigarettes a day     Alcohol use No     Family History   Problem Relation Age of Onset     GASTROINTESTINAL DISEASE Mother      hiatal hernia     Neurologic Disorder Mother      tremor     Arthritis Father      HEART DISEASE Father      father  at age 84 of a MI that occurred while having knee replacements     GASTROINTESTINAL DISEASE Brother      Cancer Sister      sister  of ovarian cancer in her 60's         Current Outpatient Prescriptions   Medication Sig Dispense Refill     ACE NOT PRESCRIBED, INTENTIONAL, by Other route continuous prn.  0     aspirin EC 81 MG tablet Take 1 tablet (81 mg) by mouth daily       B Complex Vitamins (VITAMIN-B COMPLEX) TABS Take 1 tablet by mouth daily       calcium carbonate antacid (PX ANTACID MAXIMUM STRENGTH) 1000 MG CHEW        calcium carbonate-vitamin D (CALCIUM + D) 600-200 MG-UNIT TABS Take 1 tablet by mouth daily. 60 tablet      clonazePAM (KLONOPIN) 0.5 MG tablet TAKE 1 TABLET EVERY DAY 90 tablet 0     donepezil (ARICEPT) 10 MG tablet Take 1 tablet (10 mg) by mouth At Bedtime 90 tablet 1     glipiZIDE (GLUCOTROL) 5 MG tablet TAKE 1 TABLET EVERY DAY 90 tablet 0     memantine  (NAMENDA) 10 MG tablet TAKE 1 TABLET (10 MG) BY MOUTH 2 TIMES DAILY 180 tablet 0     metFORMIN (GLUCOPHAGE) 1000 MG tablet TAKE 1 TABLET TWICE DAILY WITH MEALS 180 tablet 0     metoprolol tartrate (LOPRESSOR) 25 MG tablet TAKE 1 TABLET (25 MG) BY MOUTH 2 TIMES DAILY 180 tablet 0     MULTIVITAMINS OR TABS   1 tab daily       simvastatin (ZOCOR) 20 MG tablet TAKE 1 TABLET (20 MG) BY MOUTH AT BEDTIME 90 tablet 0     blood glucose monitoring (ACCU-CHEK YVETTE) test strip test twice a day or as directed and lancets (Patient not taking: Reported on 6/9/2017) 3 Box 3     blood glucose monitoring (ACCU-CHEK COMPACT CARE KIT) meter device kit Use to test blood sugars 3 times daily or as directed. (Patient not taking: Reported on 6/9/2017) 1 kit 0     blood glucose monitoring (ACCU-CHEK FASTCLIX) lancets Use to test blood sugar 2 times daily or as directed. (Patient not taking: Reported on 6/9/2017) 3 Box 3     Blood Glucose Monitoring Suppl (ACCU-CHEK COMPLETE) KIT 1 Device 2 times daily (Patient not taking: Reported on 6/9/2017) 1 Device 0     ranitidine (ZANTAC) 150 MG tablet TAKE 1 TABLET TWICE DAILY (Patient not taking: Reported on 2/9/2018) 180 tablet 1     Allergies   Allergen Reactions     No Known Drug Allergies      Recent Labs   Lab Test  02/09/18   1104  06/09/17   0943  11/17/16   0937  12/29/15  09/21/15   0857  07/30/15   0850   09/11/14   0851   01/20/12   0858   02/17/11   1150   A1C  7.8*  7.1*  6.6*   < >   --    --   6.4*   < >  7.4*   < >  7.0*   < >  6.9*   LDL  Cannot estimate LDL when triglyceride exceeds 400 mg/dL  64   --   47   --    --   50   --    --   40   < >  61   --   71   HDL  32*   --   31*   --    --   34*   --    --   36*   < >  34*   --   29*   TRIG  670*   --   389*   --    --   385*   --    --   303*   < >  284*   --   177*   ALT   --    --    --    --   22   --    --    --    --    --   26   --   24   CR   --   1.06   --    --   1.15   --   1.18   < >  1.05   < >  1.00   --   0.95  "  GFRESTIMATED   --   68   --    --    --    --   60*   --   69   < >  73   --   78   GFRESTBLACK   --   82   --    --    --    --   73   --   83   < >  89   --   >90   POTASSIUM   --    --    --    --   4.2   --   4.6   < >  4.3   < >  4.8   --   4.5   TSH   --    --   2.24   --    --    --    --    --   1.41   < >   --    --    --     < > = values in this interval not displayed.      BP Readings from Last 3 Encounters:   10/05/18 120/64   02/09/18 130/80   08/30/17 126/72    Wt Readings from Last 3 Encounters:   10/05/18 189 lb 1.6 oz (85.8 kg)   02/09/18 190 lb 9.6 oz (86.5 kg)   08/30/17 183 lb 8 oz (83.2 kg)                  Labs reviewed in EPIC    Reviewed and updated as needed this visit by clinical staff  Tobacco  Allergies  Meds  Med Hx  Surg Hx  Fam Hx  Soc Hx      Reviewed and updated as needed this visit by Provider         ROS:  Constitutional, HEENT, cardiovascular, pulmonary, gi and gu systems are negative, except as otherwise noted.    OBJECTIVE:     /64 (BP Location: Left arm, Patient Position: Chair, Cuff Size: Adult Large)  Pulse 64  Temp 97  F (36.1  C) (Temporal)  Resp 14  Ht 5' 11\" (1.803 m)  Wt 189 lb 1.6 oz (85.8 kg)  SpO2 95%  BMI 26.37 kg/m2  Body mass index is 26.37 kg/(m^2).  GENERAL: healthy, alert and no distress  NECK: no adenopathy, no asymmetry, masses, or scars and thyroid normal to palpation  RESP: lungs clear to auscultation - no rales, rhonchi or wheezes  CV: regular rate and rhythm, normal S1 S2, no S3 or S4, no murmur, click or rub, no peripheral edema and peripheral pulses strong  ABDOMEN: soft, nontender, no hepatosplenomegaly, no masses and bowel sounds normal  MS: no gross musculoskeletal defects noted, no edema    Diagnostic Test Results:  Results for orders placed or performed in visit on 10/05/18 (from the past 24 hour(s))   Hemoglobin A1c   Result Value Ref Range    Hemoglobin A1C 7.9 (H) 0 - 5.6 %       ASSESSMENT/PLAN:     1. Type 2 diabetes " mellitus with complication, without long-term current use of insulin (H)  Chronic controlled. The current medical regimen is effective;  continue present plan and medications.   - Hemoglobin A1c  - JUST IN CASE  - metFORMIN (GLUCOPHAGE) 1000 MG tablet; TAKE 1 TABLET TWICE DAILY WITH MEALS  Dispense: 180 tablet; Refill: 1  - simvastatin (ZOCOR) 20 MG tablet; TAKE 1 TABLET (20 MG) BY MOUTH AT BEDTIME  Dispense: 90 tablet; Refill: 1    2. Hypertension goal BP (blood pressure) < 140/90  Chronic controlled. The current medical regimen is effective;  continue present plan and medications.   - Creatinine  - JUST IN CASE    3. Need for prophylactic vaccination and inoculation against influenza  - FLU VACCINE, INCREASED ANTIGEN, PRESV FREE, AGE 65+ [31241]  - ADMIN INFLUENZA (For MEDICARE Patients ONLY) []    4. Type 2 diabetes mellitus with hyperglycemia, without long-term current use of insulin (H)  Chronic controlled. The current medical regimen is effective;  continue present plan and medications.   - glipiZIDE (GLUCOTROL) 5 MG tablet; TAKE 1 TABLET EVERY DAY  Dispense: 90 tablet; Refill: 1    5. Postsurgical aortocoronary bypass status  Chronic controlled. The current medical regimen is effective;  continue present plan and medications.   - metoprolol tartrate (LOPRESSOR) 25 MG tablet; TAKE 1 TABLET (25 MG) BY MOUTH 2 TIMES DAILY  Dispense: 180 tablet; Refill: 1    6. Hyperlipidemia LDL goal <100  Chronic controlled. The current medical regimen is effective;  continue present plan and medications.   - simvastatin (ZOCOR) 20 MG tablet; TAKE 1 TABLET (20 MG) BY MOUTH AT BEDTIME  Dispense: 90 tablet; Refill: 1    7. 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 The current medical regimen is effective;  continue present plan and medications.   - simvastatin (ZOCOR) 20 MG tablet; TAKE 1 TABLET (20 MG) BY MOUTH AT BEDTIME  Dispense: 90 tablet; Refill: 1    8. Late  onset Alzheimer's disease without behavioral disturbance  Chronic stable. The current medical regimen is effective;  continue present plan and medications.   - memantine (NAMENDA) 10 MG tablet; TAKE 1 TABLET (10 MG) BY MOUTH 2 TIMES DAILY  Dispense: 180 tablet; Refill: 1  - donepezil (ARICEPT) 10 MG tablet; Take 1 tablet (10 mg) by mouth At Bedtime  Dispense: 90 tablet; Refill: 1    FUTURE APPOINTMENTS:       - Follow-up visit in 6 months.  Work on weight loss  Regular exercise    Kasi Craig MD  [facility]    Injectable Influenza Immunization Documentation    1.  Is the person to be vaccinated sick today?   No    2. Does the person to be vaccinated have an allergy to a component   of the vaccine?   No  Egg Allergy Algorithm Link    3. Has the person to be vaccinated ever had a serious reaction   to influenza vaccine in the past?   No    4. Has the person to be vaccinated ever had Guillain-Barré syndrome?   No    Form completed by Ayana Reed MA 10/5/2018  10:18 AM

## 2018-10-05 NOTE — MR AVS SNAPSHOT
After Visit Summary   10/5/2018    Parker Ricks    MRN: [id number]           Patient Information     Date Of Birth          1940        Visit Information        Provider Department      10/5/2018 9:40 AM Kasi Craig MD [facility]        Today's Diagnoses     Type 2 diabetes mellitus with complication, without long-term current use of insulin (H)    -  1    Hypertension goal BP (blood pressure) < 140/90        Need for prophylactic vaccination and inoculation against influenza        Type 2 diabetes mellitus with hyperglycemia, without long-term current use of insulin (H)        Postsurgical aortocoronary bypass status        Hyperlipidemia LDL goal <100        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      Who to contact     If you have questions or need follow up information about today's clinic visit or your schedule please contact [facility] directly at [phone].  Normal or non-critical lab and imaging results will be communicated to you by [employer], letter or phone within 4 business days after the clinic has received the results. If you do not hear from us within 7 days, please contact the clinic through [place] or phone. If you have a critical or abnormal lab result, we will notify you by phone as soon as possible.  Submit refill requests through [employer] or call your pharmacy and they will forward the refill request to us. Please allow 3 business days for your refill to be completed.          Additional Information About Your Visit        Com[employer] Information     [employer] gives you secure access to your electronic health record. If you see a primary care provider, you can also send messages to  "your care team and make appointments. If you have questions, please call your primary care clinic.  If you do not have a primary care provider, please call [phone] and they will assist you.        Care EveryWhere ID     This is your Care EveryWhere ID. This could be used by other organizations to access your [place] medical records  [id number]        Your Vitals Were     Pulse Temperature Respirations Height Pulse Oximetry BMI (Body Mass Index)    64 97  F (36.1  C) (Temporal) 14 5' 11\" (1.803 m) 95% 26.37 kg/m2       Blood Pressure from Last 3 Encounters:   10/05/18 120/64   02/09/18 130/80   08/30/17 126/72    Weight from Last 3 Encounters:   10/05/18 189 lb 1.6 oz (85.8 kg)   02/09/18 190 lb 9.6 oz (86.5 kg)   08/30/17 183 lb 8 oz (83.2 kg)              We Performed the Following     ADMIN INFLUENZA (For MEDICARE Patients ONLY) []     Creatinine     FLU VACCINE, INCREASED ANTIGEN, PRESV FREE, AGE 65+ [77105]     Hemoglobin A1c     JUST IN CASE          Where to get your medicines      These medications were sent to [facility] - [facility] [address]  [address], [facility] [address]     Phone:  [phone]     donepezil 10 MG tablet    glipiZIDE 5 MG tablet    memantine 10 MG tablet    metFORMIN 1000 MG tablet    metoprolol tartrate 25 MG tablet    simvastatin 20 MG tablet          Primary Care Provider Office Phone # Fax #    Kasi Craig [phone]       [address] [facility] [address]        Equal Access to Services     [facility] : Hadii aad ku hadasho Sopierre, waaxda luqadaha, qaybta kaalmaleandro sol. So [facility] [phone].    ATENCIÓN: Si habla español, tiene a merino disposición servicios gratuitos de asistencia lingüística. Alo blunt [phone].    We comply with applicable federal civil rights laws and [place] laws. We do not discriminate on the basis of race, color, national origin, " age, disability, sex, sexual orientation, or gender identity.            Thank you!     Thank you for choosing [facility]  for your care. Our goal is always to provide you with excellent care. Hearing back from our patients is one way we can continue to improve our services. Please take a few minutes to complete the written survey that you may receive in the mail after your visit with us. Thank you!             Your Updated Medication List - Protect others around you: Learn how to safely use, store and throw away your medicines at www.disposemymeds.org.          This list is accurate as of 10/5/18 10:41 AM.  Always use your most recent med list.                   Brand Name Dispense Instructions for use Diagnosis    * ACCU-CHEK COMPLETE Kit     1 Device    1 Device 2 times daily    Type 2 diabetes mellitus with complication (H)       * blood glucose monitoring meter device kit     1 kit    Use to test blood sugars 3 times daily or as directed.    Type 2 diabetes mellitus with complication (H)       ACE NOT PRESCRIBED (INTENTIONAL)      by Other route continuous prn.    Type 2 diabetes, HbA1C goal < 8% (H)       aspirin 81 MG EC tablet      Take 1 tablet (81 mg) by mouth daily    Coronary atherosclerosis of unspecified type of vessel, native or graft, Type 2 diabetes, HbA1C goal < 8% (H)       blood glucose monitoring lancets     3 Box    Use to test blood sugar 2 times daily or as directed.    Type 2 diabetes, HbA1C goal < 8% (H)       blood glucose monitoring test strip    ACCU-CHEK YVETTE    3 Box    test twice a day or as directed and lancets    Type 2 diabetes mellitus with complication (H)       calcium + D 600-200 MG-UNIT Tabs   Generic drug:  calcium carbonate-vitamin D     60 tablet    Take 1 tablet by mouth daily.    Calcium deficiency, Vitamin D deficiency       clonazePAM 0.5 MG tablet    klonoPIN    90 tablet    TAKE 1 TABLET EVERY DAY    Anxiety       donepezil 10 MG tablet    ARICEPT    90  tablet    Take 1 tablet (10 mg) by mouth At Bedtime    Late onset Alzheimer's disease without behavioral disturbance       glipiZIDE 5 MG tablet    GLUCOTROL    90 tablet    TAKE 1 TABLET EVERY DAY    Type 2 diabetes mellitus with hyperglycemia, without long-term current use of insulin (H)       memantine 10 MG tablet    NAMENDA    180 tablet    TAKE 1 TABLET (10 MG) BY MOUTH 2 TIMES DAILY    Late onset Alzheimer's disease without behavioral disturbance       metFORMIN 1000 MG tablet    GLUCOPHAGE    180 tablet    TAKE 1 TABLET TWICE DAILY WITH MEALS    Type 2 diabetes mellitus with complication, without long-term current use of insulin (H)       metoprolol tartrate 25 MG tablet    LOPRESSOR    180 tablet    TAKE 1 TABLET (25 MG) BY MOUTH 2 TIMES DAILY    Postsurgical aortocoronary bypass status       multivitamin per tablet      1 tab daily        PX ANTACID MAXIMUM STRENGTH 1000 MG Chew   Generic drug:  calcium carbonate antacid           ranitidine 150 MG tablet    ZANTAC    180 tablet    TAKE 1 TABLET TWICE DAILY    Gastroesophageal reflux disease without esophagitis       simvastatin 20 MG tablet    ZOCOR    90 tablet    TAKE 1 TABLET (20 MG) BY MOUTH AT BEDTIME    Hyperlipidemia LDL goal <100, Type 2 diabetes mellitus with complication, without long-term current use of insulin (H), 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 Read the directions carefully, and ask your doctor or other care provider to review them with you.

## 2018-12-17 DIAGNOSIS — F41.9 ANXIETY: ICD-10-CM

## 2018-12-19 RX ORDER — CLONAZEPAM 0.5 MG/1
TABLET ORAL
Qty: 90 TABLET | Refills: 0 | Status: SHIPPED | OUTPATIENT
Start: 2018-12-19 | End: 2019-02-28

## 2018-12-19 NOTE — TELEPHONE ENCOUNTER
Rx faxed to pharmacy - [facility]    Alexa Peace XRO/  [facility]

## 2018-12-19 NOTE — TELEPHONE ENCOUNTER
clonazePAM (KLONOPIN) 0.5 MG tablet      Last Written Prescription Date:  8/14/18  Last Fill Quantity: 90,   # refills: 0  Last Office Visit: 10/5/18  Future Office visit:       Routing refill request to provider for review/approval because:  Drug not on the [facility], [facility] or [facility] refill protocol or controlled substance

## 2018-12-19 NOTE — TELEPHONE ENCOUNTER
Requested Prescriptions   Pending Prescriptions Disp Refills     clonazePAM (KLONOPIN) 0.5 MG tablet [Pharmacy Med Name: CLONAZEPAM 0.5 MG Tablet] 90 tablet 0     Sig: TAKE 1 TABLET EVERY DAY    There is no refill protocol information for this order

## 2019-02-28 DIAGNOSIS — F41.9 ANXIETY: ICD-10-CM

## 2019-03-01 RX ORDER — CLONAZEPAM 0.5 MG/1
TABLET ORAL
Qty: 90 TABLET | Refills: 0 | Status: SHIPPED | OUTPATIENT
Start: 2019-03-01 | End: 2019-03-23

## 2019-03-01 NOTE — TELEPHONE ENCOUNTER
Routing refill request to provider for review/approval because:  Drug not on the [facility] refill protocol     Klonopin  Last Written Prescription Date:  12/19/2018  Last Fill Quantity: 90,  # refills: 0   Last office visit: 10/5/2018 with prescribing provider:  10/5/2018   Future Office Visit:      Requested Prescriptions   Pending Prescriptions Disp Refills     clonazePAM (KLONOPIN) 0.5 MG tablet [Pharmacy Med Name: CLONAZEPAM 0.5 MG Tablet] 90 tablet 0     Sig: TAKE 1 TABLET EVERY DAY    There is no refill protocol information for this order          Mel Colbert RN on 3/1/2019 at 11:09 AM

## 2019-03-23 DIAGNOSIS — F41.9 ANXIETY: ICD-10-CM

## 2019-03-25 RX ORDER — CLONAZEPAM 0.5 MG/1
TABLET ORAL
Qty: 90 TABLET | Refills: 0 | Status: SHIPPED | OUTPATIENT
Start: 2019-03-31 | End: 2019-09-09

## 2019-03-25 NOTE — TELEPHONE ENCOUNTER
Clonazepam 0.5 MG       Last Written Prescription Date:  3/1/19  Last Fill Quantity: 90,   # refills: 0  Last Office Visit: 10/5/18  Future Office visit:       Routing refill request to provider for review/approval because:  Drug not on the [facility], [facility] or [facility] refill protocol or controlled substance

## 2019-04-26 ENCOUNTER — OFFICE VISIT (OUTPATIENT)
Dept: FAMILY MEDICINE | Facility: OTHER | Age: 79
End: 2019-04-26
Payer: COMMERCIAL

## 2019-04-26 VITALS
BODY MASS INDEX: 26.94 KG/M2 | TEMPERATURE: 97.2 F | SYSTOLIC BLOOD PRESSURE: 126 MMHG | WEIGHT: 193.19 LBS | OXYGEN SATURATION: 96 % | HEART RATE: 60 BPM | RESPIRATION RATE: 16 BRPM | DIASTOLIC BLOOD PRESSURE: 70 MMHG

## 2019-04-26 DIAGNOSIS — Z79.4 TYPE 2 DIABETES MELLITUS WITH COMPLICATION, WITH LONG-TERM CURRENT USE OF INSULIN (H): ICD-10-CM

## 2019-04-26 DIAGNOSIS — E78.5 HYPERLIPIDEMIA LDL GOAL <100: ICD-10-CM

## 2019-04-26 DIAGNOSIS — E11.65 TYPE 2 DIABETES MELLITUS WITH HYPERGLYCEMIA, WITHOUT LONG-TERM CURRENT USE OF INSULIN (H): ICD-10-CM

## 2019-04-26 DIAGNOSIS — E11.8 TYPE 2 DIABETES MELLITUS WITH COMPLICATION, WITHOUT LONG-TERM CURRENT USE OF INSULIN (H): ICD-10-CM

## 2019-04-26 DIAGNOSIS — Z95.1 POSTSURGICAL AORTOCORONARY BYPASS STATUS: ICD-10-CM

## 2019-04-26 DIAGNOSIS — G30.1 LATE ONSET ALZHEIMER'S DISEASE WITHOUT BEHAVIORAL DISTURBANCE (H): ICD-10-CM

## 2019-04-26 DIAGNOSIS — I25.799: ICD-10-CM

## 2019-04-26 DIAGNOSIS — E11.8 TYPE 2 DIABETES MELLITUS WITH COMPLICATION, WITH LONG-TERM CURRENT USE OF INSULIN (H): ICD-10-CM

## 2019-04-26 DIAGNOSIS — Z00.00 ENCOUNTER FOR MEDICARE ANNUAL WELLNESS EXAM: Primary | ICD-10-CM

## 2019-04-26 DIAGNOSIS — F02.80 LATE ONSET ALZHEIMER'S DISEASE WITHOUT BEHAVIORAL DISTURBANCE (H): ICD-10-CM

## 2019-04-26 LAB
CHOLEST SERPL-MCNC: 158 MG/DL
CREAT UR-MCNC: 159 MG/DL
HBA1C MFR BLD: 8.7 % (ref 0–5.6)
HDLC SERPL-MCNC: 30 MG/DL
LDLC SERPL CALC-MCNC: ABNORMAL MG/DL
LDLC SERPL DIRECT ASSAY-MCNC: 62 MG/DL
MICROALBUMIN UR-MCNC: 97 MG/L
MICROALBUMIN/CREAT UR: 61.01 MG/G CR (ref 0–17)
NONHDLC SERPL-MCNC: 128 MG/DL
TRIGL SERPL-MCNC: 596 MG/DL
TSH SERPL DL<=0.005 MIU/L-ACNC: 2.59 MU/L (ref 0.4–4)

## 2019-04-26 PROCEDURE — 99214 OFFICE O/P EST MOD 30 MIN: CPT | Mod: 25 | Performed by: FAMILY MEDICINE

## 2019-04-26 PROCEDURE — 84443 ASSAY THYROID STIM HORMONE: CPT | Performed by: FAMILY MEDICINE

## 2019-04-26 PROCEDURE — 80061 LIPID PANEL: CPT | Performed by: FAMILY MEDICINE

## 2019-04-26 PROCEDURE — 83721 ASSAY OF BLOOD LIPOPROTEIN: CPT | Mod: 59 | Performed by: FAMILY MEDICINE

## 2019-04-26 PROCEDURE — 82043 UR ALBUMIN QUANTITATIVE: CPT | Performed by: FAMILY MEDICINE

## 2019-04-26 PROCEDURE — 36415 COLL VENOUS BLD VENIPUNCTURE: CPT | Performed by: FAMILY MEDICINE

## 2019-04-26 PROCEDURE — 83036 HEMOGLOBIN GLYCOSYLATED A1C: CPT | Performed by: FAMILY MEDICINE

## 2019-04-26 PROCEDURE — 99397 PER PM REEVAL EST PAT 65+ YR: CPT | Performed by: FAMILY MEDICINE

## 2019-04-26 RX ORDER — DONEPEZIL HYDROCHLORIDE 10 MG/1
10 TABLET, FILM COATED ORAL AT BEDTIME
Qty: 90 TABLET | Refills: 1 | Status: SHIPPED | OUTPATIENT
Start: 2019-04-26 | End: 2019-08-29

## 2019-04-26 RX ORDER — MEMANTINE HYDROCHLORIDE 10 MG/1
TABLET ORAL
Qty: 180 TABLET | Refills: 1 | Status: SHIPPED | OUTPATIENT
Start: 2019-04-26 | End: 2019-09-11

## 2019-04-26 RX ORDER — METOPROLOL TARTRATE 25 MG/1
TABLET, FILM COATED ORAL
Qty: 180 TABLET | Refills: 1 | Status: SHIPPED | OUTPATIENT
Start: 2019-04-26 | End: 2019-09-11

## 2019-04-26 RX ORDER — GLIPIZIDE 5 MG/1
5 TABLET ORAL
Qty: 180 TABLET | Refills: 1 | Status: SHIPPED | OUTPATIENT
Start: 2019-04-26 | End: 2019-05-01

## 2019-04-26 RX ORDER — SIMVASTATIN 20 MG
TABLET ORAL
Qty: 90 TABLET | Refills: 1 | Status: SHIPPED | OUTPATIENT
Start: 2019-04-26 | End: 2019-09-11

## 2019-04-26 RX ORDER — GLIPIZIDE 5 MG/1
TABLET ORAL
Qty: 90 TABLET | Refills: 1 | Status: SHIPPED | OUTPATIENT
Start: 2019-04-26 | End: 2019-04-26

## 2019-04-26 ASSESSMENT — ENCOUNTER SYMPTOMS
CHILLS: 0
JOINT SWELLING: 0
WEAKNESS: 0
COUGH: 0
DIZZINESS: 0
MYALGIAS: 0
NAUSEA: 0
EYE PAIN: 0
HEARTBURN: 0
NERVOUS/ANXIOUS: 0
DYSURIA: 0
CONSTIPATION: 0
FEVER: 0
HEMATURIA: 0
PALPITATIONS: 0
ABDOMINAL PAIN: 0
DIARRHEA: 0
HEMATOCHEZIA: 0
HEADACHES: 0
FREQUENCY: 0
SHORTNESS OF BREATH: 0
SORE THROAT: 0
PARESTHESIAS: 0
ARTHRALGIAS: 1

## 2019-04-26 ASSESSMENT — ACTIVITIES OF DAILY LIVING (ADL)
CURRENT_FUNCTION: MEDICATION ADMINISTRATION REQUIRES ASSISTANCE
CURRENT_FUNCTION: TELEPHONE REQUIRES ASSISTANCE
CURRENT_FUNCTION: MONEY MANAGEMENT REQUIRES ASSISTANCE

## 2019-04-26 ASSESSMENT — PAIN SCALES - GENERAL: PAINLEVEL: NO PAIN (0)

## 2019-04-26 NOTE — PROGRESS NOTES
"SUBJECTIVE:   Parker Ricks is a 78 year old male who presents for Preventive Visit.  Are you in the first 12 months of your Medicare coverage?  No    Healthy Habits:     In general, how would you rate your overall health?  Good    Frequency of exercise:  1 day/week    Duration of exercise:  N/A    Do you usually eat at least 4 servings of fruit and vegetables a day, include whole grains    & fiber and avoid regularly eating high fat or \"junk\" foods?  Yes    Taking medications regularly:  Yes    Barriers to taking medications:  None    Medication side effects:  None    Ability to successfully perform activities of daily living:  Telephone requires assistance, medication administration requires assistance and money management requires assistance    Home Safety:  Lack of grab bars in the bathroom    Hearing Impairment:  No hearing concerns    In the past 6 months, have you been bothered by leaking of urine?  No    In general, how would you rate your overall mental or emotional health?  Poor      PHQ-2 Total Score: 0    Additional concerns today:  No    Do you feel safe in your environment? Yes    Do you have a Health Care Directive? Yes: Advance Directive has been received and scanned.      Fall risk  Fallen 2 or more times in the past year?: No  Any fall with injury in the past year?: No    Cognitive Screening Not appropriate due to known dementia    Do you have sleep apnea, excessive snoring or daytime drowsiness?: yes, daytime drowsiness    Reviewed and updated as needed this visit by clinical staff  Tobacco  Allergies  Meds  Med Hx  Surg Hx  Fam Hx  Soc Hx        Reviewed and updated as needed this visit by Provider        Social History     Tobacco Use     Smoking status: Current Every Day Smoker     Packs/day: 0.30     Years: 40.00     Pack years: 12.00     Types: Cigarettes     Smokeless tobacco: Never Used     Tobacco comment: 3 cigarettes a day   Substance Use Topics     Alcohol use: No     If " you drink alcohol do you typically have >3 drinks per day or >7 drinks per week? No    Alcohol Use 4/26/2019   Prescreen: >3 drinks/day or >7 drinks/week? Not Applicable   Prescreen: >3 drinks/day or >7 drinks/week? -           Diabetes Follow-up      Patient is checking blood sugars: rarely.  Results range from 150    Diabetic concerns: None     Symptoms of hypoglycemia (low blood sugar): none     Paresthesias (numbness or burning in feet) or sores: No     Date of last diabetic eye exam: > 1 year ago    Diabetes Management Resources    Hyperlipidemia Follow-Up      Rate your low fat/cholesterol diet?: not monitoring fat    Taking statin?  Yes, no muscle aches from statin    Other lipid medications/supplements?:  none    Hypertension Follow-up      Outpatient blood pressures are not being checked.    Low Salt Diet: not monitoring salt    BP Readings from Last 2 Encounters:   04/26/19 126/70   10/05/18 120/64     Hemoglobin A1C (%)   Date Value   04/26/2019 8.7 (H)   10/05/2018 7.9 (H)     LDL Cholesterol Calculated (mg/dL)   Date Value   04/26/2019     Cannot estimate LDL when triglyceride exceeds 400 mg/dL   02/09/2018     Cannot estimate LDL when triglyceride exceeds 400 mg/dL     LDL Cholesterol Direct (mg/dL)   Date Value   04/26/2019 62   02/09/2018 64     Vascular Disease Follow-up:  Coronary Artery Disease (CAD)      Chest pain or pressure, left side neck or arm pain: No    Shortness of breath/increased sweats/nausea with exertion: No    Pain in calves walking 1-2 blocks: No    Worsened or new symptoms since last visit: No    Nitroglycerin use: no    Daily aspirin use: Yes      Current providers sharing in care for this patient include:   Patient Care Team:  Kasi Craig MD as PCP - General (Family Practice)  Kasi Craig MD as Assigned PCP    The following health maintenance items are reviewed in Epic and correct as of today:  Health Maintenance   Topic Date Due     ZOSTER IMMUNIZATION (1 of 2)  09/19/1989     PNEUMOCOCCAL IMMUNIZATION 65+ LOW/MEDIUM RISK (2 of 2 - PCV13) 10/15/2011     EYE EXAM Q1 YEAR  09/25/2014     MEDICARE ANNUAL WELLNESS VISIT  11/20/2014     FOOT EXAM Q1 YEAR  09/11/2015     TSH W/ FREE T4 REFLEX Q2 YEAR  11/17/2018     PHQ-2  01/01/2019     LIPID MONITORING Q1 YEAR  02/09/2019     MICROALBUMIN Q1 YEAR  02/09/2019     A1C Q6 MO  04/05/2019     CREATININE Q1 YEAR  10/05/2019     FALL RISK ASSESSMENT  10/05/2019     COLONOSCOPY Q5 YR  06/03/2020     ADVANCE DIRECTIVE PLANNING Q5 YRS  02/09/2023     DTAP/TDAP/TD IMMUNIZATION (2 - Td) 01/09/2027     INFLUENZA VACCINE  Completed     IPV IMMUNIZATION  Aged Out     MENINGITIS IMMUNIZATION  Aged Out     Labs reviewed in EPIC  BP Readings from Last 3 Encounters:   04/26/19 126/70   10/05/18 120/64   02/09/18 130/80    Wt Readings from Last 3 Encounters:   04/26/19 87.6 kg (193 lb 3 oz)   10/05/18 85.8 kg (189 lb 1.6 oz)   02/09/18 86.5 kg (190 lb 9.6 oz)                  Patient Active Problem List   Diagnosis     Essential and other specified forms of tremor     Orchitis and epididymitis     Coronary atherosclerosis     History of colonic polyps     Diverticulosis of large intestine     Type 2 diabetes mellitus with complication (H)     Hyperlipidemia LDL goal <100     Anxiety     Hypertension goal BP (blood pressure) < 140/90     Advanced directives, counseling/discussion     Tobacco abuse     Gastroesophageal reflux disease without esophagitis     Coronary atherosclerosis     Acute posthemorrhagic anemia     Dementia without behavioral disturbance     Myocardial infarction, nontransmural (H)     Postsurgical aortocoronary bypass status     Other secondary thrombocytopenia     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)     Late onset Alzheimer's disease without behavioral disturbance     Past Surgical History:   Procedure Laterality Date     C NONSPECIFIC PROCEDURE  1995    Angioplasty with stent  placement     HC COLONOSCOPY W BIOPSY  03/03/10    repeat in 5 yrs     HC COLONOSCOPY W/WO BRUSH/WASH      Colonoscopy with polypectomy       Social History     Tobacco Use     Smoking status: Current Every Day Smoker     Packs/day: 0.30     Years: 40.00     Pack years: 12.00     Types: Cigarettes     Smokeless tobacco: Never Used     Tobacco comment: 3 cigarettes a day   Substance Use Topics     Alcohol use: No     Family History   Problem Relation Age of Onset     Gastrointestinal Disease Mother         hiatal hernia     Neurologic Disorder Mother         tremor     Arthritis Father      Heart Disease Father         father  at age 84 of a MI that occurred while having knee replacements     Gastrointestinal Disease Brother      Cancer Sister         sister  of ovarian cancer in her 60's         Current Outpatient Medications   Medication Sig Dispense Refill     ACE NOT PRESCRIBED, INTENTIONAL, by Other route continuous prn.  0     aspirin EC 81 MG tablet Take 1 tablet (81 mg) by mouth daily       B Complex Vitamins (VITAMIN-B COMPLEX) TABS Take 1 tablet by mouth daily       calcium carbonate antacid (PX ANTACID MAXIMUM STRENGTH) 1000 MG CHEW        calcium carbonate-vitamin D (CALCIUM + D) 600-200 MG-UNIT TABS Take 1 tablet by mouth daily. 60 tablet      clonazePAM (KLONOPIN) 0.5 MG tablet TAKE 1 TABLET EVERY DAY 90 tablet 0     donepezil (ARICEPT) 10 MG tablet Take 1 tablet (10 mg) by mouth At Bedtime 90 tablet 1     glipiZIDE (GLUCOTROL) 5 MG tablet TAKE 1 TABLET EVERY DAY 90 tablet 1     memantine (NAMENDA) 10 MG tablet TAKE 1 TABLET (10 MG) BY MOUTH 2 TIMES DAILY 180 tablet 1     metFORMIN (GLUCOPHAGE) 1000 MG tablet TAKE 1 TABLET TWICE DAILY WITH MEALS 180 tablet 1     metoprolol tartrate (LOPRESSOR) 25 MG tablet TAKE 1 TABLET (25 MG) BY MOUTH 2 TIMES DAILY 180 tablet 1     MULTIVITAMINS OR TABS   1 tab daily       ranitidine (ZANTAC) 150 MG tablet TAKE 1 TABLET TWICE DAILY 180 tablet 1      simvastatin (ZOCOR) 20 MG tablet TAKE 1 TABLET (20 MG) BY MOUTH AT BEDTIME 90 tablet 1     blood glucose monitoring (ACCU-CHEK YVETTE) test strip test twice a day or as directed and lancets (Patient not taking: Reported on 6/9/2017) 3 Box 3     blood glucose monitoring (ACCU-CHEK COMPACT CARE KIT) meter device kit Use to test blood sugars 3 times daily or as directed. (Patient not taking: Reported on 6/9/2017) 1 kit 0     blood glucose monitoring (ACCU-CHEK FASTCLIX) lancets Use to test blood sugar 2 times daily or as directed. (Patient not taking: Reported on 6/9/2017) 3 Box 3     Blood Glucose Monitoring Suppl (ACCU-CHEK COMPLETE) KIT 1 Device 2 times daily (Patient not taking: Reported on 6/9/2017) 1 Device 0     Allergies   Allergen Reactions     No Known Drug Allergies      Recent Labs   Lab Test 10/05/18  1018 02/09/18  1104 06/09/17  0943 11/17/16  0937  12/29/15 09/21/15  0857 07/30/15  0850  09/11/14  0851  01/20/12  0858  02/17/11  1150   A1C 7.9* 7.8* 7.1* 6.6*   < >  --   --  6.4*   < > 7.4*   < > 7.0*   < > 6.9*   LDL  --  Cannot estimate LDL when triglyceride exceeds 400 mg/dL  64  --  47  --   --  50  --   --  40   < > 61  --  71   HDL  --  32*  --  31*  --   --  34*  --   --  36*   < > 34*  --  29*   TRIG  --  670*  --  389*  --   --  385*  --   --  303*   < > 284*  --  177*   ALT  --   --   --   --   --  22  --   --   --   --   --  26  --  24   CR 1.11  --  1.06  --   --  1.15  --  1.18   < > 1.05   < > 1.00  --  0.95   GFRESTIMATED 64  --  68  --   --   --   --  60*  --  69   < > 73  --  78   GFRESTBLACK 77  --  82  --   --   --   --  73  --  83   < > 89  --  >90   POTASSIUM  --   --   --   --   --  4.2  --  4.6   < > 4.3   < > 4.8  --  4.5   TSH  --   --   --  2.24  --   --   --   --   --  1.41   < >  --   --   --     < > = values in this interval not displayed.      Pneumonia Vaccine:Adults age 65+ who received Pneumovax (PPSV23) at 65 years or older: Should be given PCV13 > 1 year after their  "most recent PPSV23    Review of Systems   Constitutional: Negative for chills and fever.   HENT: Negative for congestion, ear pain, hearing loss and sore throat.    Eyes: Negative for pain and visual disturbance.   Respiratory: Negative for cough and shortness of breath.    Cardiovascular: Negative for chest pain, palpitations and peripheral edema.   Gastrointestinal: Negative for abdominal pain, constipation, diarrhea, heartburn, hematochezia and nausea.   Genitourinary: Positive for impotence. Negative for discharge, dysuria, frequency, genital sores, hematuria and urgency.   Musculoskeletal: Positive for arthralgias. Negative for joint swelling and myalgias.   Skin: Negative for rash.   Neurological: Negative for dizziness, weakness, headaches and paresthesias.   Psychiatric/Behavioral: Negative for mood changes. The patient is not nervous/anxious.          OBJECTIVE:   /70 (BP Location: Left arm, Patient Position: Chair, Cuff Size: Adult Regular)   Pulse 60   Temp 97.2  F (36.2  C) (Temporal)   Resp 16   Wt 87.6 kg (193 lb 3 oz)   SpO2 96%   BMI 26.94 kg/m   Estimated body mass index is 26.94 kg/m  as calculated from the following:    Height as of 10/5/18: 1.803 m (5' 11\").    Weight as of this encounter: 87.6 kg (193 lb 3 oz).  Physical Exam  GENERAL: healthy, alert and no distress  EYES: Eyes grossly normal to inspection, PERRL and conjunctivae and sclerae normal  HENT: ear canals and TM's normal, nose and mouth without ulcers or lesions  NECK: no adenopathy, no asymmetry, masses, or scars and thyroid normal to palpation  RESP: lungs clear to auscultation - no rales, rhonchi or wheezes  CV: regular rates and rhythm, normal S1 S2, no S3 or S4, grade 3/6 systolic murmur heard best over the aortic distribution with radiation to carotids, peripheral pulses strong and no peripheral edema  ABDOMEN: soft, nontender, no hepatosplenomegaly, no masses and bowel sounds normal  MS: no gross musculoskeletal " defects noted, no edema  SKIN: no suspicious lesions or rashes  NEURO: Normal strength and tone, mentation intact and speech normal  PSYCH: mentation appears normal, affect normal/bright  Diabetic foot exam: normal DP and PT pulses, no trophic changes or ulcerative lesions and normal sensory exam    Diagnostic Test Results:  Results for orders placed or performed in visit on 04/26/19   Hemoglobin A1c   Result Value Ref Range    Hemoglobin A1C 8.7 (H) 0 - 5.6 %   Lipid panel reflex to direct LDL Fasting   Result Value Ref Range    Cholesterol 158 <200 mg/dL    Triglycerides 596 (H) <150 mg/dL    HDL Cholesterol 30 (L) >39 mg/dL    LDL Cholesterol Calculated  <100 mg/dL     Cannot estimate LDL when triglyceride exceeds 400 mg/dL    Non HDL Cholesterol 128 <130 mg/dL   Albumin Random Urine Quantitative with Creat Ratio   Result Value Ref Range    Creatinine Urine 159 mg/dL    Albumin Urine mg/L 97 mg/L    Albumin Urine mg/g Cr 61.01 (H) 0 - 17 mg/g Cr   TSH with free T4 reflex   Result Value Ref Range    TSH 2.59 0.40 - 4.00 mU/L   LDL cholesterol direct   Result Value Ref Range    LDL Cholesterol Direct 62 <100 mg/dL       ASSESSMENT / PLAN:       ICD-10-CM    1. Encounter for Medicare annual wellness exam Z00.00    2. Type 2 diabetes mellitus with complication, with long-term current use of insulin (H) E11.8 Hemoglobin A1c    Z79.4 Albumin Random Urine Quantitative with Creat Ratio     TSH with free T4 reflex     JUST IN CASE     LDL cholesterol direct     LDL cholesterol direct   3. Hyperlipidemia LDL goal <100 E78.5 Lipid panel reflex to direct LDL Fasting     TSH with free T4 reflex     JUST IN CASE     simvastatin (ZOCOR) 20 MG tablet   4. Late onset Alzheimer's disease without behavioral disturbance G30.1 donepezil (ARICEPT) 10 MG tablet    F02.80 memantine (NAMENDA) 10 MG tablet   5. Type 2 diabetes mellitus with hyperglycemia, without long-term current use of insulin (H) E11.65 glipiZIDE (GLUCOTROL) 5 MG  tablet     DISCONTINUED: glipiZIDE (GLUCOTROL) 5 MG tablet   6. Type 2 diabetes mellitus with complication, without long-term current use of insulin (H) E11.8 metFORMIN (GLUCOPHAGE) 1000 MG tablet     simvastatin (ZOCOR) 20 MG tablet   7. Postsurgical aortocoronary bypass status Z95.1 metoprolol tartrate (LOPRESSOR) 25 MG tablet   8. 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 Encounter for Medicare annual wellness exam  Encourage immunization update, review of advanced directives for specificity.    2. Type 2 diabetes mellitus with complication, with long-term current use of insulin (H)  Interval loss of control over the winter with 5 pound associated weight gain and rising A1c to 8.7.  He is on max dose of metformin.  Will increase his glucotrol to 5 mg twice daily.  Recheck A1c in 3 months.  - Hemoglobin A1c  - Albumin Random Urine Quantitative with Creat Ratio  - TSH with free T4 reflex  - JUST IN CASE  - LDL cholesterol direct  - LDL cholesterol direct    3. Hyperlipidemia LDL goal <100  LDL cholesterol goal of less than 100.  His triglycerides remain elevated but LDL total cholesterol remain under good control.  Continue simvastatin 20 mg once daily.  - Lipid panel reflex to direct LDL Fasting  - TSH with free T4 reflex  - JUST IN CASE  - simvastatin (ZOCOR) 20 MG tablet; TAKE 1 TABLET (20 MG) BY MOUTH AT BEDTIME  Dispense: 90 tablet; Refill: 1    4. Late onset Alzheimer's disease without behavioral disturbance  Chronic slowly declining cognitive function. Defers to wife Lencho for most answers. He still walks daily and engages in social activities with friends.  - donepezil (ARICEPT) 10 MG tablet; Take 1 tablet (10 mg) by mouth At Bedtime  Dispense: 90 tablet; Refill: 1  - memantine (NAMENDA) 10 MG tablet; TAKE 1 TABLET (10 MG) BY MOUTH 2 TIMES DAILY  Dispense: 180 tablet; Refill: 1    5. Type 2 diabetes mellitus with  hyperglycemia, without long-term current use of insulin (H)  Interval loss of control over the winter with 5 pound associated weight gain and rising A1c to 8.7.  He is on max dose of metformin.  Will increase his glucotrol to 5 mg twice daily.  Recheck A1c in 3 months.    - glipiZIDE (GLUCOTROL) 5 MG tablet; Take 1 tablet (5 mg) by mouth 2 times daily (before meals) TAKE 1 TABLET EVERY DAY  Dispense: 180 tablet; Refill: 1    - metFORMIN (GLUCOPHAGE) 1000 MG tablet; TAKE 1 TABLET TWICE DAILY WITH MEALS  Dispense: 180 tablet; Refill: 1  - simvastatin (ZOCOR) 20 MG tablet; TAKE 1 TABLET (20 MG) BY MOUTH AT BEDTIME  Dispense: 90 tablet; Refill: 1    7. Postsurgical aortocoronary bypass status  Chronic stable without anginal symptoms. BP well controlled. Unfortunately he continues to smoke. The current medical regimen is effective;  continue present plan and medications.   - metoprolol tartrate (LOPRESSOR) 25 MG tablet; TAKE 1 TABLET (25 MG) BY MOUTH 2 TIMES DAILY  Dispense: 180 tablet; Refill: 1    8. 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 BP well controlled. Unfortunately he continues to smoke. The current medical regimen is effective;  continue present plan and medications.   - simvastatin (ZOCOR) 20 MG tablet; TAKE 1 TABLET (20 MG) BY MOUTH AT BEDTIME  Dispense: 90 tablet; Refill: 1   End of Life Planning:  Patient currently has an advanced directive: Yes.  Practitioner is supportive of decision. Encourage to review for specificity of choice.    COUNSELING:  Reviewed preventive health counseling, as reflected in patient instructions       Regular exercise       Healthy diet/nutrition       Vision screening       Hearing screening       Dental care       Fall risk prevention       Immunizations    Vaccinated for: Pneumococcal and Zoster at [facility].           Aspirin Prophylaxsis       Consider lung cancer screening for ages  "55-80 years and 30 pack-year smoking history     BP Readings from Last 1 Encounters:   04/26/19 126/70     Estimated body mass index is 26.94 kg/m  as calculated from the following:    Height as of 10/5/18: 1.803 m (5' 11\").    Weight as of this encounter: 87.6 kg (193 lb 3 oz).           reports that he has been smoking cigarettes.  He has a 12.00 pack-year smoking history. He has never used smokeless tobacco.  Tobacco Cessation Action Plan: Information offered: Patient not interested at this time    Appropriate preventive services were discussed with this patient, including applicable screening as appropriate for cardiovascular disease, diabetes, osteopenia/osteoporosis, and glaucoma.  As appropriate for age/gender, discussed screening for colorectal cancer, prostate cancer, breast cancer, and cervical cancer. Checklist reviewing preventive services available has been given to the patient.    Reviewed patients plan of care and provided an AVS. The Basic Care Plan (routine screening as documented in Health Maintenance) for Parker meets the Care Plan requirement. This Care Plan has been established and reviewed with the Patient and spouse.    Counseling Resources:  ATP IV Guidelines  Pooled Cohorts Equation Calculator  Breast Cancer Risk Calculator  FRAX Risk Assessment  ICSI Preventive Guidelines  Dietary Guidelines for Americans, 2010  USDA's MyPlate  ASA Prophylaxis  Lung CA Screening    Kasi Craig MD  [facility]    Identified Health Risks:    He is at risk for lack of exercise and has been provided with information to increase physical activity for the benefit of his well-being.  The patient reports that he has difficulty with activities of daily living. I have asked that the patient make a follow up appointment in 3 months where this issue will be further evaluated and addressed.  The patient was provided with suggestions to help him develop a healthy emotional lifestyle.  "

## 2019-04-26 NOTE — PATIENT INSTRUCTIONS
Patient Education   Personalized Prevention Plan  You are due for the preventive services outlined below.  Your care team is available to assist you in scheduling these services.  If you have already completed any of these items, please share that information with your care team to update in your medical record.  Health Maintenance Due   Topic Date Due     Zoster (Shingles) Vaccine (1 of 2) 09/19/1989     Pneumococcal Vaccine (2 of 2 - PCV13) 10/15/2011     Eye Exam - yearly  09/25/2014     Annual Wellness Visit  11/20/2014     Diabetic Foot Exam - yearly  09/11/2015     Thyroid Function Lab (TSH) - every 2 years  11/17/2018     PHQ-2  01/01/2019     Cholesterol Lab - yearly  02/09/2019     Microalbumin Lab - yearly  02/09/2019     A1C (Diabetes) Lab - every 6 months  04/05/2019       Exercise for a Healthier Heart     Exercise with a friend. When activity is fun, you're more likely to stick with it.   You may wonder how you can improve the health of your heart. If you re thinking about exercise, you re on the right track. You don t need to become an athlete, but you do need a certain amount of brisk exercise to help strengthen your heart. If you have been diagnosed with a heart condition, your doctor may recommend exercise to help stabilize your condition. To help make exercise a habit, choose safe, fun activities.  Be sure to check with your healthcare provider before starting an exercise program.   Why exercise?  Exercising regularly offers many healthy rewards. It can help you do all of the following:    Improve your blood cholesterol level to help prevent further heart trouble    Lower your blood pressure to help prevent a stroke or heart attack    Control diabetes, or reduce your risk of getting this disease    Improve your heart and lung function    Reach and maintain a healthy weight    Make your muscles stronger and more limber so you can stay active    Prevent falls and fractures by slowing the loss of  bone mass (osteoporosis)    Manage stress better    Reduce your blood pressure    Improve your sense of self and your body image  Exercise tips  Ease into your routine. Set small goals. Then build on them.  Exercise on most days. Aim for a total of 150 or more minutes of moderate to  vigorous intensity activity each week. Consider 40 minutes, 3 to 4 times a week. For best results, activity should last for 40 minutes on average. It is OK to work up to the 40 minute period over time. Examples of moderate-intensity activity is walking 1 mile in 15 minutes or 30 to 45 minutes of yard work.  Step up your daily activity level. Along with your exercise program, try being more active throughout the day. Walk instead of drive. Do more household tasks or yard work.  Choose one or more activities you enjoy. Walking is one of the easiest things you can do. You can also try swimming, riding a bike, dancing, or taking an exercise class.  Stop exercising and call your doctor if you:    Have chest pain or feel dizzy or lightheaded    Feel burning, tightness, pressure, or heaviness in your chest, neck, shoulders, back, or arms    Have unusual shortness of breath    Have increased joint or muscle pain    Have palpitations or an irregular heartbeat   Date Last Reviewed: 5/1/2016 2000-2018 [employer]. [address] [place] [address]. All rights reserved. This information is not intended as a substitute for professional medical care. Always follow your healthcare professional's instructions.        Activities of Daily Living    Your Health Risk Assessment indicates you have difficulties with activities of daily living such as housework, bathing, preparing meals, taking medication, etc. Please make a follow up appointment for us to address this issue in more detail.  Your Health Risk Assessment indicates you feel you are not in good emotional health.    Recreation   Recreation is not limited to sports and team  events. It includes any activity that provides relaxation, interest, enjoyment, and exercise. Recreation provides an outlet for physical, mental, and social energy. It can give a sense of worth and achievement. It can help you stay healthy.    Mental Exercise and Social Involvement  Mental and emotional health is as important as physical health. Keep in touch with friends and family. Stay as active as possible. Continue to learn and challenge yourself.   Things you can do to stay mentally active are:    Learn something new, like a foreign language or musical instrument.     Play SCRABBLE or do crossword puzzles. If you cannot find people to play these games with you at home, you can play them with others on your computer through the Internet.     Join a games club--anything from card games to chess or checkers or lawn bowling.     Start a new hobby.     Go back to school.     Volunteer.     Read.   Keep up with world events.

## 2019-05-01 DIAGNOSIS — E11.65 TYPE 2 DIABETES MELLITUS WITH HYPERGLYCEMIA, WITHOUT LONG-TERM CURRENT USE OF INSULIN (H): ICD-10-CM

## 2019-05-01 RX ORDER — GLIPIZIDE 5 MG/1
5 TABLET ORAL
Qty: 180 TABLET | Refills: 1 | Status: CANCELLED | OUTPATIENT
Start: 2019-05-01

## 2019-05-01 RX ORDER — GLIPIZIDE 5 MG/1
5 TABLET ORAL
Qty: 180 TABLET | Refills: 1 | Status: SHIPPED | OUTPATIENT
Start: 2019-05-01 | End: 2019-09-11

## 2019-05-29 ENCOUNTER — TRANSFERRED RECORDS (OUTPATIENT)
Dept: HEALTH INFORMATION MANAGEMENT | Facility: CLINIC | Age: 79
End: 2019-05-29

## 2019-08-05 ENCOUNTER — TRANSFERRED RECORDS (OUTPATIENT)
Dept: HEALTH INFORMATION MANAGEMENT | Facility: CLINIC | Age: 79
End: 2019-08-05

## 2019-08-26 ENCOUNTER — MYC REFILL (OUTPATIENT)
Dept: FAMILY MEDICINE | Facility: OTHER | Age: 79
End: 2019-08-26

## 2019-08-26 DIAGNOSIS — G30.1 LATE ONSET ALZHEIMER'S DISEASE WITHOUT BEHAVIORAL DISTURBANCE (H): ICD-10-CM

## 2019-08-26 DIAGNOSIS — F02.80 LATE ONSET ALZHEIMER'S DISEASE WITHOUT BEHAVIORAL DISTURBANCE (H): ICD-10-CM

## 2019-08-26 RX ORDER — DONEPEZIL HYDROCHLORIDE 10 MG/1
10 TABLET, FILM COATED ORAL AT BEDTIME
Qty: 90 TABLET | Refills: 1 | Status: CANCELLED | OUTPATIENT
Start: 2019-08-26

## 2019-08-26 NOTE — TELEPHONE ENCOUNTER
"Requested Prescriptions   Pending Prescriptions Disp Refills     donepezil (ARICEPT) 10 MG tablet 90 tablet 1     Sig: Take 1 tablet (10 mg) by mouth At Bedtime   Last Written Prescription Date:  04/26/2019  Last Fill Quantity: 90,  # refills: 1   Last office visit: 4/26/2019 with prescribing provider:  04/26/2019   Future Office Visit:   Next 5 appointments (look out 90 days)    Sep 11, 2019  9:40 AM CDT  Office Visit with Kasi Craig MD  [facility] ([address] [phone]             Miscellaneous Dementia Agents Passed - 8/26/2019 12:04 PM        Passed - Recent (12 mo) or future (30 days) visit within the authorizing provider's specialty     Patient had office visit in the last 12 months or has a visit in the next 30 days with authorizing provider or within the authorizing provider's specialty.  See \"Patient Info\" tab in inbasket, or \"Choose Columns\" in Meds & Orders section of the refill encounter.              Passed - Medication is active on med list        Passed - Patient is 18 years of age or older          "

## 2019-08-26 NOTE — TELEPHONE ENCOUNTER
Prescription was sent 4/26/19 for #90 with 1 refill.    Shania Mckoy RN  [facility]

## 2019-08-29 ENCOUNTER — MYC REFILL (OUTPATIENT)
Dept: FAMILY MEDICINE | Facility: OTHER | Age: 79
End: 2019-08-29

## 2019-08-29 ENCOUNTER — MYC MEDICAL ADVICE (OUTPATIENT)
Dept: FAMILY MEDICINE | Facility: OTHER | Age: 79
End: 2019-08-29

## 2019-08-29 DIAGNOSIS — F02.80 LATE ONSET ALZHEIMER'S DISEASE WITHOUT BEHAVIORAL DISTURBANCE (H): ICD-10-CM

## 2019-08-29 DIAGNOSIS — G30.1 LATE ONSET ALZHEIMER'S DISEASE WITHOUT BEHAVIORAL DISTURBANCE (H): ICD-10-CM

## 2019-08-29 RX ORDER — DONEPEZIL HYDROCHLORIDE 5 MG/1
5 TABLET, FILM COATED ORAL AT BEDTIME
Qty: 90 TABLET | Refills: 3 | Status: SHIPPED | OUTPATIENT
Start: 2019-08-29 | End: 2020-09-30

## 2019-09-09 DIAGNOSIS — F41.9 ANXIETY: ICD-10-CM

## 2019-09-09 RX ORDER — CLONAZEPAM 0.5 MG/1
TABLET ORAL
Qty: 30 TABLET | Refills: 2 | Status: SHIPPED | OUTPATIENT
Start: 2019-09-09 | End: 2019-12-11

## 2019-09-09 NOTE — TELEPHONE ENCOUNTER
Clonazepam 0.5 MG       Last Written Prescription Date:  3/31/19  Last Fill Quantity: 90,   # refills: 0  Last Office Visit: 4/26/19  Future Office visit:    Next 5 appointments (look out 90 days)    Sep 11, 2019  9:40 AM CDT  Office Visit with Kasi Craig MD  [facility] ([facility]) [address] [facility] [address]  [phone]           Routing refill request to provider for review/approval because:  Drug not on the [facility], [facility] or [facility] refill protocol or controlled substance

## 2019-09-11 ENCOUNTER — OFFICE VISIT (OUTPATIENT)
Dept: FAMILY MEDICINE | Facility: OTHER | Age: 79
End: 2019-09-11
Payer: COMMERCIAL

## 2019-09-11 VITALS
OXYGEN SATURATION: 95 % | BODY MASS INDEX: 27.17 KG/M2 | HEIGHT: 71 IN | DIASTOLIC BLOOD PRESSURE: 68 MMHG | HEART RATE: 58 BPM | SYSTOLIC BLOOD PRESSURE: 122 MMHG | TEMPERATURE: 95.9 F | RESPIRATION RATE: 14 BRPM | WEIGHT: 194.06 LBS

## 2019-09-11 DIAGNOSIS — E11.65 TYPE 2 DIABETES MELLITUS WITH HYPERGLYCEMIA, WITHOUT LONG-TERM CURRENT USE OF INSULIN (H): ICD-10-CM

## 2019-09-11 DIAGNOSIS — E78.5 HYPERLIPIDEMIA LDL GOAL <100: ICD-10-CM

## 2019-09-11 DIAGNOSIS — G30.1 LATE ONSET ALZHEIMER'S DISEASE WITHOUT BEHAVIORAL DISTURBANCE (H): ICD-10-CM

## 2019-09-11 DIAGNOSIS — I25.799: ICD-10-CM

## 2019-09-11 DIAGNOSIS — E11.8 TYPE 2 DIABETES MELLITUS WITH COMPLICATION, WITHOUT LONG-TERM CURRENT USE OF INSULIN (H): Primary | ICD-10-CM

## 2019-09-11 DIAGNOSIS — Z95.1 POSTSURGICAL AORTOCORONARY BYPASS STATUS: ICD-10-CM

## 2019-09-11 DIAGNOSIS — F02.80 LATE ONSET ALZHEIMER'S DISEASE WITHOUT BEHAVIORAL DISTURBANCE (H): ICD-10-CM

## 2019-09-11 LAB
ANION GAP SERPL CALCULATED.3IONS-SCNC: 9 MMOL/L (ref 3–14)
BUN SERPL-MCNC: 16 MG/DL (ref 7–30)
CALCIUM SERPL-MCNC: 8.7 MG/DL (ref 8.5–10.1)
CHLORIDE SERPL-SCNC: 103 MMOL/L (ref 94–109)
CO2 SERPL-SCNC: 27 MMOL/L (ref 20–32)
CREAT SERPL-MCNC: 1.19 MG/DL (ref 0.66–1.25)
GFR SERPL CREATININE-BSD FRML MDRD: 57 ML/MIN/{1.73_M2}
GLUCOSE SERPL-MCNC: 231 MG/DL (ref 70–99)
HBA1C MFR BLD: 9.2 % (ref 0–5.6)
POTASSIUM SERPL-SCNC: 4.6 MMOL/L (ref 3.4–5.3)
SODIUM SERPL-SCNC: 139 MMOL/L (ref 133–144)

## 2019-09-11 PROCEDURE — 80048 BASIC METABOLIC PNL TOTAL CA: CPT | Performed by: FAMILY MEDICINE

## 2019-09-11 PROCEDURE — 83036 HEMOGLOBIN GLYCOSYLATED A1C: CPT | Performed by: FAMILY MEDICINE

## 2019-09-11 PROCEDURE — 99214 OFFICE O/P EST MOD 30 MIN: CPT | Performed by: FAMILY MEDICINE

## 2019-09-11 PROCEDURE — 36415 COLL VENOUS BLD VENIPUNCTURE: CPT | Performed by: FAMILY MEDICINE

## 2019-09-11 RX ORDER — SIMVASTATIN 20 MG
TABLET ORAL
Qty: 90 TABLET | Refills: 1 | Status: SHIPPED | OUTPATIENT
Start: 2019-09-11 | End: 2020-02-14

## 2019-09-11 RX ORDER — GLIPIZIDE 5 MG/1
TABLET ORAL
Qty: 270 TABLET | Refills: 1 | Status: SHIPPED | OUTPATIENT
Start: 2019-09-11 | End: 2020-01-16

## 2019-09-11 RX ORDER — MEMANTINE HYDROCHLORIDE 10 MG/1
TABLET ORAL
Qty: 180 TABLET | Refills: 1 | Status: SHIPPED | OUTPATIENT
Start: 2019-09-11 | End: 2020-02-14

## 2019-09-11 RX ORDER — METOPROLOL TARTRATE 25 MG/1
TABLET, FILM COATED ORAL
Qty: 180 TABLET | Refills: 1 | Status: SHIPPED | OUTPATIENT
Start: 2019-09-11 | End: 2020-02-14

## 2019-09-11 ASSESSMENT — MIFFLIN-ST. JEOR: SCORE: 1617.39

## 2019-09-11 ASSESSMENT — PAIN SCALES - GENERAL: PAINLEVEL: NO PAIN (0)

## 2019-09-11 NOTE — LETTER
[facility]  [address]   [address]  Tel. [phone]  Fax [phone]                 & Vehicle Services   Evaluation  [address] [place] [phone]        Parker Ricks  [address] [facility] [id number]  9/11/2019               I have been asked to furnish you with a statement as to Parker Ricks's physical and mental condition in regards to his ability to safely operate a motor vehicle. It is my opinion that unless he can pass both a written exam and driving exam Parker should restrict driving to daylight hours on familiar local roads. If concerns continue I would recommend Occupational Therapy evaluation for cognitive function and possible OTR driving test    Sincerely,        Electronically signed by Kasi Craig MD

## 2019-09-11 NOTE — PROGRESS NOTES
Subjective     Gilbert Mookie Ricks is a 78 year old male who presents to clinic today for the following health issues:    HPI   Diabetes Follow-up      How often are you checking your blood sugar? Not at all    What time of day are you checking your blood sugars (select all that apply)?  Not checking    Have you had any blood sugars above 200?  Not checking    Have you had any blood sugars below 70?  Not checking     What symptoms do you notice when your blood sugar is low?  None    What concerns do you have today about your diabetes? None     Do you have any of these symptoms? (Select all that apply)  No numbness or tingling in feet.  No redness, sores or blisters on feet.  No complaints of excessive thirst.  No reports of blurry vision.  No significant changes to weight.     Have you had a diabetic eye exam in the last 12 months? Yes- Date of last eye exam: 05/29/2019    BP Readings from Last 2 Encounters:   09/11/19 122/68   04/26/19 126/70     Hemoglobin A1C (%)   Date Value   04/26/2019 8.7 (H)   10/05/2018 7.9 (H)     LDL Cholesterol Calculated (mg/dL)   Date Value   04/26/2019     Cannot estimate LDL when triglyceride exceeds 400 mg/dL   02/09/2018     Cannot estimate LDL when triglyceride exceeds 400 mg/dL     LDL Cholesterol Direct (mg/dL)   Date Value   04/26/2019 62   02/09/2018 64       Diabetes Management Resources      How many servings of fruits and vegetables do you eat daily?  2-3    On average, how many sweetened beverages do you drink each day (soda, juice, sweet tea, etc)?   Diet soda     How many days per week do you miss taking your medication? 0        Mookie is a 78-year-old male with a history of type 2 diabetes managed effectively in the past with metformin and glipizide.  He also has a history of Alzheimer's dementia with progressive memory loss.  He is here with his wife Racquel dixon raji to review his blood glucose management.  He does not check his blood sugars on a daily basis.  His wife  states that he is constantly eating sweets and craves sweets.  Eat a large bowl of ice cream every night at bedtime.  Last hemoglobin A1c in April of this year was 8.7.  We increased his glipizide from 5 mg once a day to 5 mg twice a day.    His wife would also like to discuss his driving today.  They are applying for renewal of the 's licenses.  Mookie continues to drive locally during daytime hours.  She would like support that he should not be driving more.  My opinion was that he should only be driving limited amounts of time during daylight hours and local familiar roads and this should be done with someone else in the car.  He is resistant to this.  I offered to referral for occupational therapy for a cognitive evaluation for driving and possibly recommending a road testing.  His wife feels that if she has a letter from me to help review with Mookie that he should not be driving at other times this would be adequate at this time.    Patient Active Problem List   Diagnosis     Essential and other specified forms of tremor     Orchitis and epididymitis     Coronary atherosclerosis     History of colonic polyps     Diverticulosis of large intestine     Type 2 diabetes mellitus with complication (H)     Hyperlipidemia LDL goal <100     Anxiety     Hypertension goal BP (blood pressure) < 140/90     Advanced directives, counseling/discussion     Tobacco abuse     Gastroesophageal reflux disease without esophagitis     Coronary atherosclerosis     Acute posthemorrhagic anemia     Dementia without behavioral disturbance     Myocardial infarction, nontransmural (H)     Postsurgical aortocoronary bypass status     Other secondary thrombocytopenia     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)     Late onset Alzheimer's disease without behavioral disturbance     Past Surgical History:   Procedure Laterality Date     C NONSPECIFIC PROCEDURE  1995    Angioplasty with stent placement      HC COLONOSCOPY W BIOPSY  03/03/10    repeat in 5 yrs     HC COLONOSCOPY W/WO BRUSH/WASH      Colonoscopy with polypectomy       Social History     Tobacco Use     Smoking status: Current Every Day Smoker     Packs/day: 0.30     Years: 40.00     Pack years: 12.00     Types: Cigarettes     Smokeless tobacco: Never Used     Tobacco comment: 3 cigarettes a day   Substance Use Topics     Alcohol use: No     Family History   Problem Relation Age of Onset     Gastrointestinal Disease Mother         hiatal hernia     Neurologic Disorder Mother         tremor     Arthritis Father      Heart Disease Father         father  at age 84 of a MI that occurred while having knee replacements     Gastrointestinal Disease Brother      Cancer Sister         sister  of ovarian cancer in her 60's         Current Outpatient Medications   Medication Sig Dispense Refill     ACE NOT PRESCRIBED, INTENTIONAL, by Other route continuous prn.  0     aspirin EC 81 MG tablet Take 1 tablet (81 mg) by mouth daily       B Complex Vitamins (VITAMIN-B COMPLEX) TABS Take 1 tablet by mouth daily       calcium carbonate antacid (PX ANTACID MAXIMUM STRENGTH) 1000 MG CHEW        calcium carbonate-vitamin D (CALCIUM + D) 600-200 MG-UNIT TABS Take 1 tablet by mouth daily. 60 tablet      clonazePAM (KLONOPIN) 0.5 MG tablet TAKE 1 TABLET EVERY DAY  30 tablet 2     donepezil (ARICEPT) 5 MG tablet Take 1 tablet (5 mg) by mouth At Bedtime 90 tablet 3     glipiZIDE (GLUCOTROL) 5 MG tablet Take 1 tablet (5 mg) by mouth 2 times daily (before meals) 180 tablet 1     memantine (NAMENDA) 10 MG tablet TAKE 1 TABLET (10 MG) BY MOUTH 2 TIMES DAILY 180 tablet 1     metFORMIN (GLUCOPHAGE) 1000 MG tablet TAKE 1 TABLET TWICE DAILY WITH MEALS 180 tablet 1     metoprolol tartrate (LOPRESSOR) 25 MG tablet TAKE 1 TABLET (25 MG) BY MOUTH 2 TIMES DAILY 180 tablet 1     MULTIVITAMINS OR TABS   1 tab daily       simvastatin (ZOCOR) 20 MG tablet TAKE 1 TABLET (20 MG) BY MOUTH  AT BEDTIME 90 tablet 1     blood glucose monitoring (ACCU-CHEK YVETTE) test strip test twice a day or as directed and lancets (Patient not taking: Reported on 6/9/2017) 3 Box 3     blood glucose monitoring (ACCU-CHEK COMPACT CARE KIT) meter device kit Use to test blood sugars 3 times daily or as directed. (Patient not taking: Reported on 6/9/2017) 1 kit 0     blood glucose monitoring (ACCU-CHEK FASTCLIX) lancets Use to test blood sugar 2 times daily or as directed. (Patient not taking: Reported on 6/9/2017) 3 Box 3     Blood Glucose Monitoring Suppl (ACCU-CHEK COMPLETE) KIT 1 Device 2 times daily (Patient not taking: Reported on 6/9/2017) 1 Device 0     Allergies   Allergen Reactions     No Known Drug Allergies      Recent Labs   Lab Test 04/26/19  1005 10/05/18  1018 02/09/18  1104 06/09/17  0943 11/17/16  0937  12/29/15  07/30/15  0850  01/20/12  0858   A1C 8.7* 7.9* 7.8* 7.1* 6.6*   < >  --   --  6.4*   < > 7.0*   LDL Cannot estimate LDL when triglyceride exceeds 400 mg/dL  62  --  Cannot estimate LDL when triglyceride exceeds 400 mg/dL  64  --  47  --   --    < >  --    < > 61   HDL 30*  --  32*  --  31*  --   --    < >  --    < > 34*   TRIG 596*  --  670*  --  389*  --   --    < >  --    < > 284*   ALT  --   --   --   --   --   --  22  --   --   --  26   CR  --  1.11  --  1.06  --   --  1.15  --  1.18   < > 1.00   GFRESTIMATED  --  64  --  68  --   --   --   --  60*   < > 73   GFRESTBLACK  --  77  --  82  --   --   --   --  73   < > 89   POTASSIUM  --   --   --   --   --   --  4.2  --  4.6   < > 4.8   TSH 2.59  --   --   --  2.24  --   --   --   --    < >  --     < > = values in this interval not displayed.      BP Readings from Last 3 Encounters:   09/11/19 122/68   04/26/19 126/70   10/05/18 120/64    Wt Readings from Last 3 Encounters:   09/11/19 88 kg (194 lb 1 oz)   04/26/19 87.6 kg (193 lb 3 oz)   10/05/18 85.8 kg (189 lb 1.6 oz)                    Vascular Disease Follow-up      Are you having any of  "the following symptoms? (Select all that apply) No complaints of shortness of breath, chest pain or pressure.  No increased sweating or nausea with activity.  No left-sided neck or arm pain.  No complaints of pain in calves when walking 1-2 blocks.    How often do you take nitroglycerin? Never    Do you take an aspirin every day? Yes    Reviewed and updated as needed this visit by Provider  Tobacco  Allergies  Meds  Problems  Med Hx  Surg Hx  Fam Hx         Review of Systems   ROS COMP: Constitutional, HEENT, cardiovascular, pulmonary, gi and gu systems are negative, except as otherwise noted.      Objective    /68 (BP Location: Left arm, Patient Position: Chair, Cuff Size: Adult Regular)   Pulse 58   Temp 95.9  F (35.5  C) (Temporal)   Resp 14   Ht 1.803 m (5' 11\")   Wt 88 kg (194 lb 1 oz)   SpO2 95%   BMI 27.07 kg/m    Body mass index is 27.07 kg/m .  Physical Exam   GENERAL: healthy, alert and no distress  NECK: no adenopathy, no asymmetry, masses, or scars and thyroid normal to palpation  RESP: lungs clear to auscultation - no rales, rhonchi or wheezes  CV: regular rates and rhythm, normal S1 S2, no S3 or S4, grade 2/6 systolic murmur heard best over the aortic distribution, peripheral pulses strong and no peripheral edema  ABDOMEN: soft, nontender, no hepatosplenomegaly, no masses and bowel sounds normal  MS: no gross musculoskeletal defects noted, no edema    Diagnostic Test Results:  Labs reviewed in Epic  Results for orders placed or performed in visit on 09/11/19 (from the past 24 hour(s))   Basic metabolic panel  (Ca, Cl, CO2, Creat, Gluc, K, Na, BUN)   Result Value Ref Range    Sodium 139 133 - 144 mmol/L    Potassium 4.6 3.4 - 5.3 mmol/L    Chloride 103 94 - 109 mmol/L    Carbon Dioxide 27 20 - 32 mmol/L    Anion Gap 9 3 - 14 mmol/L    Glucose 231 (H) 70 - 99 mg/dL    Urea Nitrogen 16 7 - 30 mg/dL    Creatinine 1.19 0.66 - 1.25 mg/dL    GFR Estimate 57 (L) >60 mL/min/[1.73_m2]    GFR " Estimate If Black 66 >60 mL/min/[1.73_m2]    Calcium 8.7 8.5 - 10.1 mg/dL   Hemoglobin A1c   Result Value Ref Range    Hemoglobin A1C 9.2 (H) 0 - 5.6 %           Assessment & Plan     1. Type 2 diabetes mellitus with complication, without long-term current use of insulin (H)  Chronic, uncontrolled due to dietary non compliance and lack of exercise. Will increase glipizide to 10 mg in the morning and 5 mg in the evening.  Continue on the metformin twice daily.  Plan to recheck his A1c in 3 months.  Could consider increasing the glipizide up to 20 mg daily.  Also consider adding third agent such as Januvia or Actos.  His renal function remains stable so metformin is appropriate at this time.  - Basic metabolic panel  (Ca, Cl, CO2, Creat, Gluc, K, Na, BUN)  - Hemoglobin A1c  - metFORMIN (GLUCOPHAGE) 1000 MG tablet; TAKE 1 TABLET TWICE DAILY WITH MEALS  Dispense: 180 tablet; Refill: 1  - simvastatin (ZOCOR) 20 MG tablet; TAKE 1 TABLET (20 MG) BY MOUTH AT BEDTIME  Dispense: 90 tablet; Refill: 1  - glipiZIDE (GLUCOTROL) 5 MG tablet; 2 tablets in AM and 1 tablet in PM  Dispense: 270 tablet; Refill: 1    3. Hyperlipidemia LDL goal <100  Chronic controlled. Tolerating moderate statin dose. The current medical regimen is effective;  continue present plan and medications.   - simvastatin (ZOCOR) 20 MG tablet; TAKE 1 TABLET (20 MG) BY MOUTH AT BEDTIME  Dispense: 90 tablet; Refill: 1    4. 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 He is not really active due to his dementia. BP is well controlled. Will increase glipizide to 10 mg in the morning and 5 mg in the evening.  Continue on the metformin twice daily.  Plan to recheck his A1c in 3 months.  Could consider increasing the glipizide up to 20 mg daily.  Also consider adding third agent such as Januvia or Actos.  His renal function remains stable so metformin is appropriate at this time.  - simvastatin (ZOCOR) 20 MG tablet;  "TAKE 1 TABLET (20 MG) BY MOUTH AT BEDTIME  Dispense: 90 tablet; Refill: 1  - metoprolol tartrate (LOPRESSOR) 25 MG tablet; TAKE 1 TABLET (25 MG) BY MOUTH 2 TIMES DAILY  Dispense: 180 tablet; Refill: 1    6. Late onset Alzheimer's disease without behavioral disturbance  Chronic progressive.  I wrote letter to help with his wife reinforced the idea that he should be driving only during daylight hours only on familiar local roads.  His driving should be accompanied by another .  If they have further concerns about his driving ability would refer him to occupational therapy for a evaluation and possibly recommend a road test.  If family does not feel that he is capable of continuing to drive safely would just notify the Department of [employer] that he is no longer capable of driving.  Otherwise his wife is doing well with him at home.  She is having difficult time encouraging exercise and he continues to crave sweets.  We did discuss options of home care to help assist and give her some time as well as possible transition to assisted living or memory care in the future.  - memantine (NAMENDA) 10 MG tablet; TAKE 1 TABLET (10 MG) BY MOUTH 2 TIMES DAILY  Dispense: 180 tablet; Refill: 1     Tobacco Cessation:   reports that he has been smoking cigarettes.  He has a 12.00 pack-year smoking history. He has never used smokeless tobacco.  Tobacco Cessation Action Plan: Information offered: Patient not interested at this time      BMI:   Estimated body mass index is 27.07 kg/m  as calculated from the following:    Height as of this encounter: 1.803 m (5' 11\").    Weight as of this encounter: 88 kg (194 lb 1 oz).           FUTURE LABS:       - Schedule non-fasting labs in 3 months  Work on weight loss  Regular exercise    Return in about 3 months (around 12/11/2019) for Routine Visit, Lab Work.    Kasi Craig MD  [facility]      "

## 2019-10-10 ENCOUNTER — IMMUNIZATION (OUTPATIENT)
Dept: FAMILY MEDICINE | Facility: OTHER | Age: 79
End: 2019-10-10
Payer: COMMERCIAL

## 2019-10-10 PROCEDURE — 90662 IIV NO PRSV INCREASED AG IM: CPT

## 2019-10-10 PROCEDURE — G0008 ADMIN INFLUENZA VIRUS VAC: HCPCS

## 2019-12-11 DIAGNOSIS — F41.9 ANXIETY: ICD-10-CM

## 2019-12-11 RX ORDER — CLONAZEPAM 0.5 MG/1
TABLET ORAL
Qty: 30 TABLET | Refills: 0 | Status: SHIPPED | OUTPATIENT
Start: 2019-12-11 | End: 2020-01-08

## 2019-12-11 NOTE — TELEPHONE ENCOUNTER
clonazePAM (KLONOPIN) 0.5 MG tablet      Last Written Prescription Date:  9/9/19  Last Fill Quantity: 30,   # refills: 2  Last Office Visit: 9/11/19  Future Office visit:    Next 5 appointments (look out 90 days)    Heriberto 10, 2020  9:40 AM CST  [facility] [address] [facility] [id number] [phone]           Routing refill request to provider for review/approval because:  Drug not on the [facility], [facility] or [facility] refill protocol or controlled substance

## 2019-12-11 NOTE — TELEPHONE ENCOUNTER
Requested Prescriptions   Pending Prescriptions Disp Refills     clonazePAM (KLONOPIN) 0.5 MG tablet [Pharmacy Med Name: CLONAZEPAM 0.5 MG Tablet] 30 tablet 0     Sig: TAKE 1 TABLET EVERY DAY       There is no refill protocol information for this order

## 2020-01-08 DIAGNOSIS — F41.9 ANXIETY: ICD-10-CM

## 2020-01-08 RX ORDER — CLONAZEPAM 0.5 MG/1
TABLET ORAL
Qty: 30 TABLET | Refills: 0 | Status: SHIPPED | OUTPATIENT
Start: 2020-01-08 | End: 2020-01-16

## 2020-01-08 NOTE — TELEPHONE ENCOUNTER
Requested Prescriptions   Pending Prescriptions Disp Refills     clonazePAM (KLONOPIN) 0.5 MG tablet [Pharmacy Med Name: CLONAZEPAM 0.5 MG Tablet] 30 tablet      Sig: TAKE 1 TABLET EVERY DAY       There is no refill protocol information for this order

## 2020-01-08 NOTE — TELEPHONE ENCOUNTER
clonazePAM (KLONOPIN) 0.5 MG tablet      Last Written Prescription Date:  12/11/19  Last Fill Quantity: 30,   # refills: 0  Last Office Visit: 9/11/19  Future Office visit:    Next 5 appointments (look out 90 days)    Jan 16, 2020  3:10 PM CST  Office Visit with Kasi Craig MD  [facility] [address] [facility] [id number] [phone]           Routing refill request to provider for review/approval because:  Drug not on the [facility], [facility] or [facility] refill protocol or controlled substance

## 2020-01-16 ENCOUNTER — OFFICE VISIT (OUTPATIENT)
Dept: FAMILY MEDICINE | Facility: OTHER | Age: 80
End: 2020-01-16
Payer: COMMERCIAL

## 2020-01-16 VITALS
WEIGHT: 191.31 LBS | BODY MASS INDEX: 26.68 KG/M2 | TEMPERATURE: 96.9 F | OXYGEN SATURATION: 97 % | HEART RATE: 62 BPM | SYSTOLIC BLOOD PRESSURE: 128 MMHG | DIASTOLIC BLOOD PRESSURE: 80 MMHG | RESPIRATION RATE: 16 BRPM

## 2020-01-16 DIAGNOSIS — E11.8 TYPE 2 DIABETES MELLITUS WITH COMPLICATION, WITH LONG-TERM CURRENT USE OF INSULIN (H): Primary | ICD-10-CM

## 2020-01-16 DIAGNOSIS — I25.799: ICD-10-CM

## 2020-01-16 DIAGNOSIS — F41.9 ANXIETY: ICD-10-CM

## 2020-01-16 DIAGNOSIS — Z79.4 TYPE 2 DIABETES MELLITUS WITH COMPLICATION, WITH LONG-TERM CURRENT USE OF INSULIN (H): Primary | ICD-10-CM

## 2020-01-16 DIAGNOSIS — E11.65 TYPE 2 DIABETES MELLITUS WITH HYPERGLYCEMIA, WITHOUT LONG-TERM CURRENT USE OF INSULIN (H): ICD-10-CM

## 2020-01-16 LAB — HBA1C MFR BLD: 8.4 % (ref 0–5.6)

## 2020-01-16 PROCEDURE — 83036 HEMOGLOBIN GLYCOSYLATED A1C: CPT | Performed by: FAMILY MEDICINE

## 2020-01-16 PROCEDURE — 36415 COLL VENOUS BLD VENIPUNCTURE: CPT | Performed by: FAMILY MEDICINE

## 2020-01-16 PROCEDURE — 99214 OFFICE O/P EST MOD 30 MIN: CPT | Performed by: FAMILY MEDICINE

## 2020-01-16 RX ORDER — CLONAZEPAM 0.5 MG/1
TABLET ORAL
Qty: 30 TABLET | Refills: 0 | Status: CANCELLED | OUTPATIENT
Start: 2020-01-16

## 2020-01-16 RX ORDER — GLIPIZIDE 10 MG/1
10 TABLET ORAL
Qty: 180 TABLET | Refills: 1 | Status: SHIPPED | OUTPATIENT
Start: 2020-01-16 | End: 2020-01-21

## 2020-01-16 RX ORDER — CLONAZEPAM 0.5 MG/1
0.25 TABLET ORAL DAILY
Qty: 30 TABLET | Refills: 0 | COMMUNITY
Start: 2020-01-16 | End: 2020-02-14

## 2020-01-16 ASSESSMENT — PAIN SCALES - GENERAL: PAINLEVEL: NO PAIN (0)

## 2020-01-16 NOTE — PROGRESS NOTES
Subjective     Parker Ricks is a 79 year old male who presents to clinic today for the following health issues:    HPI   Diabetes Follow-up      How often are you checking your blood sugar? Not at all    What concerns do you have today about your diabetes? None     Do you have any of these symptoms? (Select all that apply)  No numbness or tingling in feet.  No redness, sores or blisters on feet.  No complaints of excessive thirst.  No reports of blurry vision.  No significant changes to weight.      BP Readings from Last 2 Encounters:   01/16/20 128/80   09/11/19 122/68     Hemoglobin A1C (%)   Date Value   09/11/2019 9.2 (H)   04/26/2019 8.7 (H)     LDL Cholesterol Calculated (mg/dL)   Date Value   04/26/2019     Cannot estimate LDL when triglyceride exceeds 400 mg/dL   02/09/2018     Cannot estimate LDL when triglyceride exceeds 400 mg/dL     LDL Cholesterol Direct (mg/dL)   Date Value   04/26/2019 62   02/09/2018 64           How many servings of fruits and vegetables do you eat daily?  0-1    On average, how many sweetened beverages do you drink each day (Examples: soda, juice, sweet tea, etc.  Do NOT count diet or artificially sweetened beverages)?   1    How many days per week do you exercise enough to make your heart beat faster? 3 or less    How many minutes a day do you exercise enough to make your heart beat faster? 10 - 19    How many days per week do you miss taking your medication? 0    Concern: wondering if can get a higher quantity of clonazepam due to almost running out.   He has been on Klonopin once daily for many years. Wife Sadie doesn't recall why. It was initially for anxiety but he denies anxiety now with his dementia. Will slowly wean dose over the next several months.    Vascular Disease Follow-up      How often do you take nitroglycerin? Never    Do you take an aspirin every day? Yes      Patient Active Problem List   Diagnosis     Essential and other specified forms of tremor      Orchitis and epididymitis     Coronary atherosclerosis     History of colonic polyps     Diverticulosis of large intestine     Type 2 diabetes mellitus with complication (H)     Hyperlipidemia LDL goal <100     Anxiety     Hypertension goal BP (blood pressure) < 140/90     Advanced directives, counseling/discussion     Tobacco abuse     Gastroesophageal reflux disease without esophagitis     Coronary atherosclerosis     Acute posthemorrhagic anemia     Dementia without behavioral disturbance (H)     Myocardial infarction, nontransmural (H)     Postsurgical aortocoronary bypass status     Other secondary thrombocytopenia     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)     Late onset Alzheimer's disease without behavioral disturbance (H)     Past Surgical History:   Procedure Laterality Date     C NONSPECIFIC PROCEDURE      Angioplasty with stent placement     HC COLONOSCOPY W BIOPSY  03/03/10    repeat in 5 yrs     HC COLONOSCOPY W/WO BRUSH/WASH      Colonoscopy with polypectomy       Social History     Tobacco Use     Smoking status: Current Every Day Smoker     Packs/day: 0.30     Years: 40.00     Pack years: 12.00     Types: Cigarettes     Smokeless tobacco: Never Used     Tobacco comment: 3 cigarettes a day   Substance Use Topics     Alcohol use: No     Family History   Problem Relation Age of Onset     Gastrointestinal Disease Mother         hiatal hernia     Neurologic Disorder Mother         tremor     Arthritis Father      Heart Disease Father         father  at age 84 of a MI that occurred while having knee replacements     Gastrointestinal Disease Brother      Cancer Sister         sister  of ovarian cancer in her 60's         Current Outpatient Medications   Medication Sig Dispense Refill     ACE NOT PRESCRIBED, INTENTIONAL, by Other route continuous prn.  0     aspirin EC 81 MG tablet Take 1 tablet (81 mg) by mouth daily       B Complex Vitamins  (VITAMIN-B COMPLEX) TABS Take 1 tablet by mouth daily       calcium carbonate antacid (PX ANTACID MAXIMUM STRENGTH) 1000 MG CHEW        calcium carbonate-vitamin D (CALCIUM + D) 600-200 MG-UNIT TABS Take 1 tablet by mouth daily. 60 tablet      clonazePAM (KLONOPIN) 0.5 MG tablet TAKE 1 TABLET EVERY DAY 30 tablet 0     donepezil (ARICEPT) 5 MG tablet Take 1 tablet (5 mg) by mouth At Bedtime 90 tablet 3     glipiZIDE (GLUCOTROL) 5 MG tablet 2 tablets in AM and 1 tablet in  tablet 1     memantine (NAMENDA) 10 MG tablet TAKE 1 TABLET (10 MG) BY MOUTH 2 TIMES DAILY 180 tablet 1     metFORMIN (GLUCOPHAGE) 1000 MG tablet TAKE 1 TABLET TWICE DAILY WITH MEALS 180 tablet 1     metoprolol tartrate (LOPRESSOR) 25 MG tablet TAKE 1 TABLET (25 MG) BY MOUTH 2 TIMES DAILY 180 tablet 1     MULTIVITAMINS OR TABS   1 tab daily       simvastatin (ZOCOR) 20 MG tablet TAKE 1 TABLET (20 MG) BY MOUTH AT BEDTIME 90 tablet 1     blood glucose monitoring (ACCU-CHEK YVETTE) test strip test twice a day or as directed and lancets (Patient not taking: Reported on 6/9/2017) 3 Box 3     blood glucose monitoring (ACCU-CHEK COMPACT CARE KIT) meter device kit Use to test blood sugars 3 times daily or as directed. (Patient not taking: Reported on 6/9/2017) 1 kit 0     blood glucose monitoring (ACCU-CHEK FASTCLIX) lancets Use to test blood sugar 2 times daily or as directed. (Patient not taking: Reported on 6/9/2017) 3 Box 3     Blood Glucose Monitoring Suppl (ACCU-CHEK COMPLETE) KIT 1 Device 2 times daily (Patient not taking: Reported on 6/9/2017) 1 Device 0     Allergies   Allergen Reactions     No Known Drug Allergies      Recent Labs   Lab Test 09/11/19  0952 04/26/19  1005 10/05/18  1018 02/09/18  1104  11/17/16  0937  12/29/15  01/20/12  0858   A1C 9.2* 8.7* 7.9* 7.8*   < > 6.6*   < >  --    < > 7.0*   LDL  --  Cannot estimate LDL when triglyceride exceeds 400 mg/dL  62  --  Cannot estimate LDL when triglyceride exceeds 400 mg/dL  64  --  47  " --   --    < > 61   HDL  --  30*  --  32*  --  31*  --   --    < > 34*   TRIG  --  596*  --  670*  --  389*  --   --    < > 284*   ALT  --   --   --   --   --   --   --  22  --  26   CR 1.19  --  1.11  --    < >  --   --  1.15   < > 1.00   GFRESTIMATED 57*  --  64  --    < >  --   --   --    < > 73   GFRESTBLACK 66  --  77  --    < >  --   --   --    < > 89   POTASSIUM 4.6  --   --   --   --   --   --  4.2   < > 4.8   TSH  --  2.59  --   --   --  2.24  --   --    < >  --     < > = values in this interval not displayed.      BP Readings from Last 3 Encounters:   01/16/20 128/80   09/11/19 122/68   04/26/19 126/70    Wt Readings from Last 3 Encounters:   01/16/20 86.8 kg (191 lb 5 oz)   09/11/19 88 kg (194 lb 1 oz)   04/26/19 87.6 kg (193 lb 3 oz)                    Reviewed and updated as needed this visit by Provider  Tobacco  Allergies  Meds  Problems  Med Hx  Surg Hx  Fam Hx         Review of Systems   ROS COMP: CONSTITUTIONAL: NEGATIVE for fever, chills, change in weight  ENT/MOUTH: NEGATIVE for ear, mouth and throat problems  RESP: NEGATIVE for significant cough or SOB  CV: NEGATIVE for chest pain, palpitations or peripheral edema  NEURO: NEGATIVE for weakness, dizziness or paresthesias and POSITIVE for mild dementia. Was seen by Neurology today with \"good check up\". No change of medications. He is no longer driving.  PSYCHIATRIC: NEGATIVE for changes in mood or affect  ROS otherwise negative      Objective    /80 (BP Location: Left arm, Patient Position: Chair, Cuff Size: Adult Regular)   Pulse 62   Temp 96.9  F (36.1  C) (Temporal)   Resp 16   Wt 86.8 kg (191 lb 5 oz)   SpO2 97%   BMI 26.68 kg/m    Body mass index is 26.68 kg/m .  Physical Exam   GENERAL: healthy, alert and no distress  NECK: no adenopathy, no asymmetry, masses, or scars and thyroid normal to palpation  RESP: lungs clear to auscultation - no rales, rhonchi or wheezes  CV: regular rate and rhythm, normal S1 S2, no S3 or S4, " no murmur, click or rub, no peripheral edema and peripheral pulses strong  ABDOMEN: soft, nontender, no hepatosplenomegaly, no masses and bowel sounds normal  MS: no gross musculoskeletal defects noted, no edema  PSYCH: affect normal/bright, appearance well groomed and defers to wife Sadie    Diagnostic Test Results:  Labs reviewed in Epic  Results for orders placed or performed in visit on 01/16/20 (from the past 24 hour(s))   Hemoglobin A1c   Result Value Ref Range    Hemoglobin A1C 8.4 (H) 0 - 5.6 %           Assessment & Plan     1. Anxiety  Chronic. Will slowly wean dose over next several months.  - clonazePAM (KLONOPIN) 0.5 MG tablet; Take 0.5 tablets (0.25 mg) by mouth daily  Dispense: 30 tablet; Refill: 0    2. Type 2 diabetes mellitus with complication, with long-term current use of insulin (H)  Chronic, improved. Will increase glucotrol to 10 mg BID and recheck A1C in 3 months.  - Hemoglobin A1c  - JUST IN CASE  - glipiZIDE (GLUCOTROL) 10 MG tablet; Take 1 tablet (10 mg) by mouth 2 times daily (before meals) 2 tablets in AM and 1 tablet in PM  Dispense: 180 tablet; Refill: 1    4. 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 Denies angina but activity level is minimal due to his dementia. The current medical regimen is effective;  continue present plan and medications.          Regular exercise    Return in about 3 months (around 4/16/2020) for Routine Visit.    Kasi Craig MD  [facility]

## 2020-01-20 ENCOUNTER — TELEPHONE (OUTPATIENT)
Dept: FAMILY MEDICINE | Facility: OTHER | Age: 80
End: 2020-01-20

## 2020-01-20 DIAGNOSIS — E11.65 TYPE 2 DIABETES MELLITUS WITH HYPERGLYCEMIA, WITHOUT LONG-TERM CURRENT USE OF INSULIN (H): ICD-10-CM

## 2020-01-20 RX ORDER — GLIPIZIDE 10 MG/1
10 TABLET ORAL
Qty: 180 TABLET | Refills: 1 | Status: CANCELLED | OUTPATIENT
Start: 2020-01-20

## 2020-01-20 NOTE — TELEPHONE ENCOUNTER
Prescription was sent 1/16/2020 for #180 with 1 refill.  Pharmacy notified via E-Prescribe refusal.     DELON SoniN, RN  [facility]

## 2020-01-20 NOTE — TELEPHONE ENCOUNTER
"Requested Prescriptions   Pending Prescriptions Disp Refills     glipiZIDE (GLUCOTROL) 10 MG tablet 180 tablet 1     Sig: Take 1 tablet (10 mg) by mouth 2 times daily (before meals) 2 tablets in AM and 1 tablet in PM   Last Written Prescription Date:  1/16/2020  Last Fill Quantity: 180,  # refills: 1   Last office visit: 1/16/2020 with prescribing provider:  1/16/2020   Future Office Visit:   Next 5 appointments (look out 90 days)    Apr 13, 2020 10:40 AM CDT  Office Visit with Kasi Craig MD  [facility] [address] [facility] [address] [phone]           Sulfonylurea Agents Passed - 1/20/2020  8:40 AM        Passed - Blood pressure less than 140/90 in past 6 months     BP Readings from Last 3 Encounters:   01/16/20 128/80   09/11/19 122/68   04/26/19 126/70                 Passed - Patient has documented LDL within the past 12 mos.     Recent Labs   Lab Test 04/26/19  1005   LDL Cannot estimate LDL when triglyceride exceeds 400 mg/dL  62             Passed - Patient has had a Microalbumin in the past 15 mos.     Recent Labs   Lab Test 04/26/19  1005   MICROL 97   UMALCR 61.01*             Passed - Patient has documented A1c within the specified period of time.     If HgbA1C is 8 or greater, it needs to be on file within the past 3 months.  If less than 8, must be on file within the past 6 months.     Recent Labs   Lab Test 01/16/20  1522   A1C 8.4*             Passed - Medication is active on med list        Passed - Patient is age 18 or older        Passed - Patient has a recent creatinine (normal) within the past 12 mos.     Recent Labs   Lab Test 09/11/19  0952   CR 1.19             Passed - Recent (6 mo) or future (30 days) visit within the authorizing provider's specialty     Patient had office visit in the last 6 months or has a visit in the next 30 days with authorizing provider or within the authorizing provider's specialty.  See \"Patient Info\" tab in " "inbasket, or \"Choose Columns\" in Meds & Orders section of the refill encounter.            "

## 2020-01-21 RX ORDER — GLIPIZIDE 10 MG/1
10 TABLET ORAL
Qty: 180 TABLET | Refills: 1 | Status: SHIPPED | OUTPATIENT
Start: 2020-01-21 | End: 2020-09-30

## 2020-01-21 NOTE — TELEPHONE ENCOUNTER
Refill Clarified.  Signed Prescriptions:                        Disp   Refills    glipiZIDE (GLUCOTROL) 10 MG tablet         180 ta*1        Sig: Take 1 tablet (10 mg) by mouth 2 times daily (before           meals)  Authorizing Provider: JANE MARTINEZ

## 2020-02-11 ENCOUNTER — MYC MEDICAL ADVICE (OUTPATIENT)
Dept: FAMILY MEDICINE | Facility: OTHER | Age: 80
End: 2020-02-11

## 2020-02-11 DIAGNOSIS — F41.9 ANXIETY: ICD-10-CM

## 2020-02-14 DIAGNOSIS — E11.8 TYPE 2 DIABETES MELLITUS WITH COMPLICATION, WITHOUT LONG-TERM CURRENT USE OF INSULIN (H): ICD-10-CM

## 2020-02-14 DIAGNOSIS — Z95.1 POSTSURGICAL AORTOCORONARY BYPASS STATUS: ICD-10-CM

## 2020-02-14 DIAGNOSIS — I25.799: ICD-10-CM

## 2020-02-14 DIAGNOSIS — E78.5 HYPERLIPIDEMIA LDL GOAL <100: ICD-10-CM

## 2020-02-14 DIAGNOSIS — F02.80 LATE ONSET ALZHEIMER'S DISEASE WITHOUT BEHAVIORAL DISTURBANCE (H): ICD-10-CM

## 2020-02-14 DIAGNOSIS — G30.1 LATE ONSET ALZHEIMER'S DISEASE WITHOUT BEHAVIORAL DISTURBANCE (H): ICD-10-CM

## 2020-02-14 RX ORDER — SIMVASTATIN 20 MG
TABLET ORAL
Qty: 90 TABLET | Refills: 1 | Status: SHIPPED | OUTPATIENT
Start: 2020-02-14 | End: 2020-09-30

## 2020-02-14 RX ORDER — MEMANTINE HYDROCHLORIDE 10 MG/1
TABLET ORAL
Qty: 180 TABLET | Refills: 1 | Status: SHIPPED | OUTPATIENT
Start: 2020-02-14 | End: 2020-09-30

## 2020-02-14 RX ORDER — METOPROLOL TARTRATE 25 MG/1
TABLET, FILM COATED ORAL
Qty: 180 TABLET | Refills: 1 | Status: SHIPPED | OUTPATIENT
Start: 2020-02-14 | End: 2020-09-30

## 2020-02-14 RX ORDER — CLONAZEPAM 0.5 MG/1
0.25 TABLET ORAL DAILY
Qty: 15 TABLET | Refills: 0 | Status: SHIPPED | OUTPATIENT
Start: 2020-02-14 | End: 2020-04-13

## 2020-02-14 NOTE — TELEPHONE ENCOUNTER
Prescription approved per [facility] Refill Protocol.    DELON SoniN, RN  [facility]

## 2020-02-14 NOTE — TELEPHONE ENCOUNTER
"Requested Prescriptions   Pending Prescriptions Disp Refills     simvastatin (ZOCOR) 20 MG tablet [Pharmacy Med Name: SIMVASTATIN 20 MG Tablet] 90 tablet 1     Sig: TAKE 1 TABLET AT BEDTIME   Last Written Prescription Date:  9/11/19  Last Fill Quantity: 90,  # refills: 1   Last office visit: 1/16/2020 with prescribing provider:  1/16/2020   Future Office Visit:   Next 5 appointments (look out 90 days)    Apr 13, 2020 10:40 AM CDT  Office Visit with Kasi Craig MD  [facility] [address] [facility] [id number] [phone]           Statins Protocol Passed - 2/14/2020  3:30 PM        Passed - LDL on file in past 12 months     Recent Labs   Lab Test 04/26/19  1005   LDL Cannot estimate LDL when triglyceride exceeds 400 mg/dL  62             Passed - No abnormal creatine kinase in past 12 months     No lab results found.             Passed - Recent (12 mo) or future (30 days) visit within the authorizing provider's specialty     Patient has had an office visit with the authorizing provider or a provider within the authorizing providers department within the previous 12 mos or has a future within next 30 days. See \"Patient Info\" tab in inbasket, or \"Choose Columns\" in Meds & Orders section of the refill encounter.              Passed - Medication is active on med list        Passed - Patient is age 18 or older        metoprolol tartrate (LOPRESSOR) 25 MG tablet [Pharmacy Med Name: METOPROLOL TARTRATE 25 MG Tablet] 180 tablet 1     Sig: TAKE 1 TABLET TWICE DAILY   Last Written Prescription Date:  9/11/19  Last Fill Quantity: 180,  # refills: 1   Last office visit: 1/16/2020 with prescribing provider:  1/16/2020   Future Office Visit:   Next 5 appointments (look out 90 days)    Apr 13, 2020 10:40 AM CDT  Office Visit with Kasi Craig MD  [facility]) [address] [facility] [id number] [phone]           Beta-Blockers " "Protocol Passed - 2/14/2020  3:30 PM        Passed - Blood pressure under 140/90 in past 12 months     BP Readings from Last 3 Encounters:   01/16/20 128/80   09/11/19 122/68   04/26/19 126/70                 Passed - Patient is age 6 or older        Passed - Recent (12 mo) or future (30 days) visit within the authorizing provider's specialty     Patient has had an office visit with the authorizing provider or a provider within the authorizing providers department within the previous 12 mos or has a future within next 30 days. See \"Patient Info\" tab in inbasket, or \"Choose Columns\" in Meds & Orders section of the refill encounter.              Passed - Medication is active on med list        metFORMIN (GLUCOPHAGE) 1000 MG tablet [Pharmacy Med Name: METFORMIN HYDROCHLORIDE 1000 MG Tablet] 180 tablet 1     Sig: TAKE 1 TABLET TWICE DAILY WITH MEALS   Last Written Prescription Date:  9/11/19  Last Fill Quantity: 180,  # refills: 1   Last office visit: 1/16/2020 with prescribing provider:  1/16/2020   Future Office Visit:   Next 5 appointments (look out 90 days)    Apr 13, 2020 10:40 AM CDT  Office Visit with Kasi Craig MD  [facility] [address] [facility] [address] [phone]           Biguanide Agents Passed - 2/14/2020  3:30 PM        Passed - Blood pressure less than 140/90 in past 6 months     BP Readings from Last 3 Encounters:   01/16/20 128/80   09/11/19 122/68   04/26/19 126/70                 Passed - Patient has documented LDL within the past 12 mos.     Recent Labs   Lab Test 04/26/19  1005   LDL Cannot estimate LDL when triglyceride exceeds 400 mg/dL  62             Passed - Patient has had a Microalbumin in the past 15 mos.     Recent Labs   Lab Test 04/26/19  1005   MICROL 97   UMALCR 61.01*             Passed - Patient is age 10 or older        Passed - Patient has documented A1c within the specified period of time.     If HgbA1C is 8 or greater, it " "needs to be on file within the past 3 months.  If less than 8, must be on file within the past 6 months.     Recent Labs   Lab Test 01/16/20  1522   A1C 8.4*             Passed - Patient's CR is NOT>1.4 OR Patient's EGFR is NOT<45 within past 12 mos.     Recent Labs   Lab Test 09/11/19  0952   GFRESTIMATED 57*   GFRESTBLACK 66       Recent Labs   Lab Test 09/11/19  0952   CR 1.19             Passed - Patient does NOT have a diagnosis of CHF.        Passed - Medication is active on med list        Passed - Recent (6 mo) or future (30 days) visit within the authorizing provider's specialty     Patient had office visit in the last 6 months or has a visit in the next 30 days with authorizing provider or within the authorizing provider's specialty.  See \"Patient Info\" tab in [employer], or \"Choose Columns\" in Meds & Orders section of the refill encounter.            memantine (NAMENDA) 10 MG tablet [Pharmacy Med Name: MEMANTINE HYDROCHLORIDE 10 MG Tablet] 180 tablet 1     Sig: TAKE 1 TABLET TWICE DAILY   Last Written Prescription Date:  9/11/19  Last Fill Quantity: 180,  # refills: 1   Last office visit: 1/16/2020 with prescribing provider:  1/16/2020   Future Office Visit:   Next 5 appointments (look out 90 days)    Apr 13, 2020 10:40 AM CDT  Office Visit with Kasi Craig MD  [facility] [address] [facility] [id number] [phone]           Miscellaneous Dementia Agents Passed - 2/14/2020  3:30 PM        Passed - Recent (12 mo) or future (30 days) visit within the authorizing provider's specialty     Patient has had an office visit with the authorizing provider or a provider within the authorizing providers department within the previous 12 mos or has a future within next 30 days. See \"Patient Info\" tab in [employer]storet, or \"Choose Columns\" in Meds & Orders section of the refill encounter.              Passed - Medication is active on med list        Passed - Patient is 18 " years of age or older

## 2020-02-14 NOTE — TELEPHONE ENCOUNTER
Please see [employer] message from Dr. Craig sent on 2/11/2020 Patient will need a new script sent to pharmacy he is out of refills.       Sadie,   Continue with the 1/2 tablet daily until I see him in 2 months. If he is doing well we will decrease the dose from there.   Electronically signed by Kasi Craig MD      Last read by Parker Ricks at 2:49 PM on 2/11/2020.     Chely Hodge MA

## 2020-03-25 ENCOUNTER — MYC MEDICAL ADVICE (OUTPATIENT)
Dept: FAMILY MEDICINE | Facility: OTHER | Age: 80
End: 2020-03-25

## 2020-04-01 ENCOUNTER — MYC MEDICAL ADVICE (OUTPATIENT)
Dept: FAMILY MEDICINE | Facility: OTHER | Age: 80
End: 2020-04-01

## 2020-04-13 ENCOUNTER — VIRTUAL VISIT (OUTPATIENT)
Dept: FAMILY MEDICINE | Facility: CLINIC | Age: 80
End: 2020-04-13
Payer: COMMERCIAL

## 2020-04-13 DIAGNOSIS — I25.799: ICD-10-CM

## 2020-04-13 DIAGNOSIS — F41.9 ANXIETY: ICD-10-CM

## 2020-04-13 DIAGNOSIS — E11.8 TYPE 2 DIABETES MELLITUS WITH COMPLICATION, WITHOUT LONG-TERM CURRENT USE OF INSULIN (H): Primary | ICD-10-CM

## 2020-04-13 DIAGNOSIS — G30.1 LATE ONSET ALZHEIMER'S DISEASE WITHOUT BEHAVIORAL DISTURBANCE (H): ICD-10-CM

## 2020-04-13 DIAGNOSIS — F02.80 LATE ONSET ALZHEIMER'S DISEASE WITHOUT BEHAVIORAL DISTURBANCE (H): ICD-10-CM

## 2020-04-13 PROCEDURE — 99214 OFFICE O/P EST MOD 30 MIN: CPT | Mod: 95 | Performed by: FAMILY MEDICINE

## 2020-04-29 DIAGNOSIS — E11.8 TYPE 2 DIABETES MELLITUS WITH COMPLICATION, WITHOUT LONG-TERM CURRENT USE OF INSULIN (H): ICD-10-CM

## 2020-04-29 NOTE — TELEPHONE ENCOUNTER
"Requested Prescriptions   Pending Prescriptions Disp Refills     metFORMIN (GLUCOPHAGE) 1000 MG tablet [Pharmacy Med Name: METFORMIN HYDROCHLORIDE 1000 MG Tablet] 180 tablet 1     Sig: TAKE 1 TABLET TWICE DAILY WITH MEALS       Biguanide Agents Failed - 4/29/2020 10:50 AM        Failed - Patient has documented A1c within the specified period of time.     If HgbA1C is 8 or greater, it needs to be on file within the past 3 months.  If less than 8, must be on file within the past 6 months.     Recent Labs   Lab Test 01/16/20  1522   A1C 8.4*             Passed - Patient is age 10 or older        Passed - Patient's CR is NOT>1.4 OR Patient's EGFR is NOT<45 within past 12 mos.     Recent Labs   Lab Test 09/11/19  0952   GFRESTIMATED 57*   GFRESTBLACK 66       Recent Labs   Lab Test 09/11/19  0952   CR 1.19             Passed - Patient does NOT have a diagnosis of CHF.        Passed - Medication is active on med list        Passed - Recent (6 mo) or future (30 days) visit within the authorizing provider's specialty     Patient had office visit in the last 6 months or has a visit in the next 30 days with authorizing provider or within the authorizing provider's specialty.  See \"Patient Info\" tab in inbasket, or \"Choose Columns\" in Meds & Orders section of the refill encounter.                 "

## 2020-06-27 ENCOUNTER — MYC MEDICAL ADVICE (OUTPATIENT)
Dept: FAMILY MEDICINE | Facility: CLINIC | Age: 80
End: 2020-06-27

## 2020-06-27 DIAGNOSIS — Z72.0 TOBACCO ABUSE: Primary | ICD-10-CM

## 2020-06-29 RX ORDER — NICOTINE 21 MG/24HR
1 PATCH, TRANSDERMAL 24 HOURS TRANSDERMAL EVERY 24 HOURS
Qty: 28 PATCH | Refills: 3 | Status: SHIPPED | OUTPATIENT
Start: 2020-06-29

## 2020-07-03 ENCOUNTER — TELEPHONE (OUTPATIENT)
Dept: FAMILY MEDICINE | Facility: OTHER | Age: 80
End: 2020-07-03

## 2020-07-03 NOTE — TELEPHONE ENCOUNTER
Prior Authorization Retail Medication Request    Medication/Dose: nicotine (NICODERM CQ) 14 MG/24HR 24 hr patch   ICD code (if different than what is on RX):  Tobacco abuse [Z72.0]   Previously Tried and Failed:  na  Rationale:  Patient has dementia, increased smoking, takes multiple medication, using patch in attempt to not add any more oral medication    Insurance Name:  HU[employer] - HU[employer]NA MEDICARE ADVANTAGE (Managed Care)  Insurance ID:  [id number]      Pharmacy Information (if different than what is on RX)  Name:  [facility]  Phone:  [phone]

## 2020-07-06 NOTE — TELEPHONE ENCOUNTER
PRIOR AUTHORIZATION DENIED    Medication: nicotine (NICODERM CQ) 14 MG/24HR 24 hr patch -DENIED    Denial Date: 7/6/2020    Denial Rational: Medicare has EXCLUDED OTC drug products from Part D coverage.        Appeal Information:

## 2020-07-06 NOTE — TELEPHONE ENCOUNTER
Central Prior Authorization Team   Phone: [phone]      PA Initiation    Medication: nicotine (NICODERM CQ) 14 MG/24HR 24 hr patch -Initiated  Insurance Company: [employer] - Phone [phone] Fax [phone]  Pharmacy Filling the Rx: [facility] - [place] [facility] - [address] [facility]  Filling Pharmacy Phone: [phone]  Filling Pharmacy Fax:    Start Date: 7/6/2020

## 2020-09-30 ENCOUNTER — OFFICE VISIT (OUTPATIENT)
Dept: FAMILY MEDICINE | Facility: CLINIC | Age: 80
End: 2020-09-30
Payer: COMMERCIAL

## 2020-09-30 VITALS
RESPIRATION RATE: 16 BRPM | WEIGHT: 185.25 LBS | DIASTOLIC BLOOD PRESSURE: 76 MMHG | SYSTOLIC BLOOD PRESSURE: 130 MMHG | TEMPERATURE: 98.3 F | HEIGHT: 72 IN | HEART RATE: 54 BPM | OXYGEN SATURATION: 99 % | BODY MASS INDEX: 25.09 KG/M2

## 2020-09-30 DIAGNOSIS — Z00.00 ENCOUNTER FOR MEDICARE ANNUAL WELLNESS EXAM: ICD-10-CM

## 2020-09-30 DIAGNOSIS — F02.80 LATE ONSET ALZHEIMER'S DISEASE WITHOUT BEHAVIORAL DISTURBANCE (H): ICD-10-CM

## 2020-09-30 DIAGNOSIS — I25.799: ICD-10-CM

## 2020-09-30 DIAGNOSIS — G30.1 LATE ONSET ALZHEIMER'S DISEASE WITHOUT BEHAVIORAL DISTURBANCE (H): ICD-10-CM

## 2020-09-30 DIAGNOSIS — I10 HYPERTENSION GOAL BP (BLOOD PRESSURE) < 140/90: ICD-10-CM

## 2020-09-30 DIAGNOSIS — E11.65 TYPE 2 DIABETES MELLITUS WITH HYPERGLYCEMIA, WITHOUT LONG-TERM CURRENT USE OF INSULIN (H): ICD-10-CM

## 2020-09-30 DIAGNOSIS — E78.5 HYPERLIPIDEMIA LDL GOAL <100: ICD-10-CM

## 2020-09-30 DIAGNOSIS — E11.8 TYPE 2 DIABETES MELLITUS WITH COMPLICATION, WITHOUT LONG-TERM CURRENT USE OF INSULIN (H): ICD-10-CM

## 2020-09-30 DIAGNOSIS — Z23 NEED FOR PROPHYLACTIC VACCINATION AND INOCULATION AGAINST INFLUENZA: ICD-10-CM

## 2020-09-30 DIAGNOSIS — E11.8 TYPE 2 DIABETES MELLITUS WITH COMPLICATION (H): Primary | ICD-10-CM

## 2020-09-30 DIAGNOSIS — Z95.1 POSTSURGICAL AORTOCORONARY BYPASS STATUS: ICD-10-CM

## 2020-09-30 LAB
ANION GAP SERPL CALCULATED.3IONS-SCNC: 5 MMOL/L (ref 3–14)
BUN SERPL-MCNC: 19 MG/DL (ref 7–30)
CALCIUM SERPL-MCNC: 9.5 MG/DL (ref 8.5–10.1)
CHLORIDE SERPL-SCNC: 105 MMOL/L (ref 94–109)
CHOLEST SERPL-MCNC: 155 MG/DL
CO2 SERPL-SCNC: 29 MMOL/L (ref 20–32)
CREAT SERPL-MCNC: 1.16 MG/DL (ref 0.66–1.25)
CREAT UR-MCNC: 179 MG/DL
GFR SERPL CREATININE-BSD FRML MDRD: 59 ML/MIN/{1.73_M2}
GLUCOSE SERPL-MCNC: 146 MG/DL (ref 70–99)
HBA1C MFR BLD: 7.9 % (ref 0–5.6)
HDLC SERPL-MCNC: 34 MG/DL
LDLC SERPL CALC-MCNC: ABNORMAL MG/DL
LDLC SERPL DIRECT ASSAY-MCNC: 54 MG/DL
MICROALBUMIN UR-MCNC: 215 MG/L
MICROALBUMIN/CREAT UR: 120.11 MG/G CR (ref 0–17)
NONHDLC SERPL-MCNC: 121 MG/DL
POTASSIUM SERPL-SCNC: 4.7 MMOL/L (ref 3.4–5.3)
SODIUM SERPL-SCNC: 139 MMOL/L (ref 133–144)
TRIGL SERPL-MCNC: 536 MG/DL

## 2020-09-30 PROCEDURE — 90662 IIV NO PRSV INCREASED AG IM: CPT | Performed by: FAMILY MEDICINE

## 2020-09-30 PROCEDURE — 80061 LIPID PANEL: CPT | Performed by: FAMILY MEDICINE

## 2020-09-30 PROCEDURE — 82043 UR ALBUMIN QUANTITATIVE: CPT | Performed by: FAMILY MEDICINE

## 2020-09-30 PROCEDURE — G0008 ADMIN INFLUENZA VIRUS VAC: HCPCS | Performed by: FAMILY MEDICINE

## 2020-09-30 PROCEDURE — 83721 ASSAY OF BLOOD LIPOPROTEIN: CPT | Performed by: FAMILY MEDICINE

## 2020-09-30 PROCEDURE — 99397 PER PM REEVAL EST PAT 65+ YR: CPT | Mod: 25 | Performed by: FAMILY MEDICINE

## 2020-09-30 PROCEDURE — 36415 COLL VENOUS BLD VENIPUNCTURE: CPT | Performed by: FAMILY MEDICINE

## 2020-09-30 PROCEDURE — 83036 HEMOGLOBIN GLYCOSYLATED A1C: CPT | Performed by: FAMILY MEDICINE

## 2020-09-30 PROCEDURE — 80048 BASIC METABOLIC PNL TOTAL CA: CPT | Performed by: FAMILY MEDICINE

## 2020-09-30 RX ORDER — SIMVASTATIN 20 MG
TABLET ORAL
Qty: 90 TABLET | Refills: 3 | Status: SHIPPED | OUTPATIENT
Start: 2020-09-30

## 2020-09-30 RX ORDER — GLIPIZIDE 10 MG/1
10 TABLET ORAL
Qty: 180 TABLET | Refills: 3 | Status: SHIPPED | OUTPATIENT
Start: 2020-09-30

## 2020-09-30 RX ORDER — METOPROLOL TARTRATE 25 MG/1
TABLET, FILM COATED ORAL
Qty: 180 TABLET | Refills: 3 | Status: SHIPPED | OUTPATIENT
Start: 2020-09-30

## 2020-09-30 RX ORDER — DONEPEZIL HYDROCHLORIDE 5 MG/1
5 TABLET, FILM COATED ORAL AT BEDTIME
Qty: 90 TABLET | Refills: 3 | Status: SHIPPED | OUTPATIENT
Start: 2020-09-30 | End: 2021-10-07

## 2020-09-30 RX ORDER — MEMANTINE HYDROCHLORIDE 10 MG/1
TABLET ORAL
Qty: 180 TABLET | Refills: 3 | Status: SHIPPED | OUTPATIENT
Start: 2020-09-30

## 2020-09-30 ASSESSMENT — ACTIVITIES OF DAILY LIVING (ADL)
CURRENT_FUNCTION: HOUSEWORK REQUIRES ASSISTANCE
CURRENT_FUNCTION: SHOPPING REQUIRES ASSISTANCE
CURRENT_FUNCTION: TRANSPORTATION REQUIRES ASSISTANCE
CURRENT_FUNCTION: MEDICATION ADMINISTRATION REQUIRES ASSISTANCE
CURRENT_FUNCTION: PREPARING MEALS REQUIRES ASSISTANCE
CURRENT_FUNCTION: MONEY MANAGEMENT REQUIRES ASSISTANCE
CURRENT_FUNCTION: TELEPHONE REQUIRES ASSISTANCE
CURRENT_FUNCTION: LAUNDRY REQUIRES ASSISTANCE

## 2020-09-30 ASSESSMENT — PAIN SCALES - GENERAL: PAINLEVEL: NO PAIN (0)

## 2020-09-30 ASSESSMENT — PATIENT HEALTH QUESTIONNAIRE - PHQ9
SUM OF ALL RESPONSES TO PHQ QUESTIONS 1-9: 14
SUM OF ALL RESPONSES TO PHQ QUESTIONS 1-9: 14
10. IF YOU CHECKED OFF ANY PROBLEMS, HOW DIFFICULT HAVE THESE PROBLEMS MADE IT FOR YOU TO DO YOUR WORK, TAKE CARE OF THINGS AT HOME, OR GET ALONG WITH OTHER PEOPLE: EXTREMELY DIFFICULT

## 2020-09-30 ASSESSMENT — MIFFLIN-ST. JEOR: SCORE: 1575.35

## 2020-09-30 NOTE — PROGRESS NOTES
He is at risk for lack of exercise and has been provided with information to increase physical activity for the benefit of his well-being.  The patient was counseled and encouraged to consider modifying their diet and eating habits. He was provided with information on recommended healthy diet options.  The patient reports that he has difficulty with activities of daily living. I have asked that the patient make a follow up appointment in 4 months   weeks where this issue will be further evaluated and addressed.  The patient was provided with written information regarding signs of hearing loss.  The patient was provided with suggestions to help him develop a healthy emotional lifestyle.

## 2020-09-30 NOTE — PROGRESS NOTES
"SUBJECTIVE:   Parker Ricks is a 80 year old male who presents for Preventive Visit.      Patient has been advised of split billing requirements and indicates understanding: Yes   Are you in the first 12 months of your Medicare coverage?  No    Healthy Habits:     In general, how would you rate your overall health?  Good    Frequency of exercise:  None    Do you usually eat at least 4 servings of fruit and vegetables a day, include whole grains    & fiber and avoid regularly eating high fat or \"junk\" foods?  No    Taking medications regularly:  Yes    Barriers to taking medications:  None    Medication side effects:  None    Ability to successfully perform activities of daily living:  Telephone requires assistance, transportation requires assistance, shopping requires assistance, preparing meals requires assistance, housework requires assistance, laundry requires assistance, medication administration requires assistance and money management requires assistance    Home Safety:  No safety concerns identified    Hearing Impairment:  Need to ask people to speak up or repeat themselves    In the past 6 months, have you been bothered by leaking of urine?  No    In general, how would you rate your overall mental or emotional health?  Poor      PHQ-2 Total Score: 3    Additional concerns today:  No    Do you feel safe in your environment? Yes    Have you ever done Advance Care Planning? (For example, a Health Directive, POLST, or a discussion with a medical provider or your loved ones about your wishes): Yes, advance care planning is on file.      Fall risk  Fallen 2 or more times in the past year?: No  Any fall with injury in the past year?: No    Cognitive Screening Not appropriate due to known dementia    Do you have sleep apnea, excessive snoring or daytime drowsiness?: yes , sleeping more     Reviewed and updated as needed this visit by clinical staff  Tobacco  Allergies  Meds  Problems  Med Hx  Surg Hx  " Fam Hx  Soc Hx          Reviewed and updated as needed this visit by Provider  Tobacco  Allergies  Meds  Problems  Med Hx  Surg Hx  Fam Hx        Social History     Tobacco Use     Smoking status: Current Every Day Smoker     Packs/day: 0.30     Years: 40.00     Pack years: 12.00     Types: Cigarettes     Smokeless tobacco: Never Used     Tobacco comment: 3 cigarettes a day   Substance Use Topics     Alcohol use: No     If you drink alcohol do you typically have >3 drinks per day or >7 drinks per week? No    Alcohol Use 9/30/2020   Prescreen: >3 drinks/day or >7 drinks/week? Not Applicable   Prescreen: >3 drinks/day or >7 drinks/week? -           Diabetes Follow-up      How often are you checking your blood sugar? Not at all    What concerns do you have today about your diabetes? None     Do you have any of these symptoms? (Select all that apply)  Weight loss and No numbness or tingling in feet.  No redness, sores or blisters on feet.  No complaints of excessive thirst.  No reports of blurry vision.  No significant changes to weight.    Have you had a diabetic eye exam in the last 12 months? No                Hyperlipidemia Follow-Up      Are you regularly taking any medication or supplement to lower your cholesterol?   Yes- Zocor    Are you having muscle aches or other side effects that you think could be caused by your cholesterol lowering medication?  No    Hypertension Follow-up      Do you check your blood pressure regularly outside of the clinic? No     Are you following a low salt diet? No    Are your blood pressures ever more than 140 on the top number (systolic) OR more   than 90 on the bottom number (diastolic), for example 140/90? No    BP Readings from Last 2 Encounters:   09/30/20 130/76   01/16/20 128/80     Hemoglobin A1C (%)   Date Value   01/16/2020 8.4 (H)   09/11/2019 9.2 (H)     LDL Cholesterol Calculated (mg/dL)   Date Value   04/26/2019     Cannot estimate LDL when triglyceride exceeds  400 mg/dL   02/09/2018     Cannot estimate LDL when triglyceride exceeds 400 mg/dL     LDL Cholesterol Direct (mg/dL)   Date Value   04/26/2019 62   02/09/2018 64         Current providers sharing in care for this patient include:   Patient Care Team:  Kasi Craig MD as PCP - General (Family Practice)  Kasi Craig MD as Assigned PCP    The following health maintenance items are reviewed in Epic and correct as of today:  Health Maintenance   Topic Date Due     PHQ-9  1940     HEPATITIS B IMMUNIZATION (1 of 3 - Risk 3-dose series) 09/19/1959     A1C  04/16/2020     MEDICARE ANNUAL WELLNESS VISIT  04/26/2020     LIPID  04/26/2020     MICROALBUMIN  04/26/2020     DIABETIC FOOT EXAM  04/26/2020     COLORECTAL CANCER SCREENING  06/03/2020     EYE EXAM  08/02/2020     INFLUENZA VACCINE (1) 09/01/2020     BMP  09/11/2020     FALL RISK ASSESSMENT  09/30/2021     ADVANCE CARE PLANNING  02/09/2023     DTAP/TDAP/TD IMMUNIZATION (2 - Td) 01/09/2027     PNEUMOCOCCAL IMMUNIZATION 65+ LOW/MEDIUM RISK  Completed     ZOSTER IMMUNIZATION  Completed     IPV IMMUNIZATION  Aged Out     MENINGITIS IMMUNIZATION  Aged Out     Labs reviewed in EPIC  BP Readings from Last 3 Encounters:   09/30/20 130/76   01/16/20 128/80   09/11/19 122/68    Wt Readings from Last 3 Encounters:   09/30/20 84 kg (185 lb 4 oz)   01/16/20 86.8 kg (191 lb 5 oz)   09/11/19 88 kg (194 lb 1 oz)                  Patient Active Problem List   Diagnosis     Essential and other specified forms of tremor     Orchitis and epididymitis     Coronary atherosclerosis     History of colonic polyps     Diverticulosis of large intestine     Type 2 diabetes mellitus with complication (H)     Hyperlipidemia LDL goal <100     Anxiety     Hypertension goal BP (blood pressure) < 140/90     Advanced directives, counseling/discussion     Tobacco abuse     Gastroesophageal reflux disease without esophagitis     Coronary atherosclerosis     Acute posthemorrhagic anemia      Dementia without behavioral disturbance (H)     Myocardial infarction, nontransmural (H)     Postsurgical aortocoronary bypass status     Other secondary thrombocytopenia     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)     Late onset Alzheimer's disease without behavioral disturbance (H)     Past Surgical History:   Procedure Laterality Date     C NONSPECIFIC PROCEDURE      Angioplasty with stent placement     HC COLONOSCOPY W BIOPSY  03/03/10    repeat in 5 yrs     HC COLONOSCOPY W/WO BRUSH/WASH      Colonoscopy with polypectomy       Social History     Tobacco Use     Smoking status: Current Every Day Smoker     Packs/day: 0.30     Years: 40.00     Pack years: 12.00     Types: Cigarettes     Smokeless tobacco: Never Used     Tobacco comment: 3 cigarettes a day   Substance Use Topics     Alcohol use: No     Family History   Problem Relation Age of Onset     Gastrointestinal Disease Mother         hiatal hernia     Neurologic Disorder Mother         tremor     Arthritis Father      Heart Disease Father         father  at age 84 of a MI that occurred while having knee replacements     Gastrointestinal Disease Brother      Cancer Sister         sister  of ovarian cancer in her 60's         Current Outpatient Medications   Medication Sig Dispense Refill     ACE NOT PRESCRIBED, INTENTIONAL, by Other route continuous prn.  0     aspirin EC 81 MG tablet Take 1 tablet (81 mg) by mouth daily       B Complex Vitamins (VITAMIN-B COMPLEX) TABS Take 1 tablet by mouth daily       calcium carbonate-vitamin D (CALCIUM + D) 600-200 MG-UNIT TABS Take 1 tablet by mouth daily. 60 tablet      COCONUT OIL PO        donepezil (ARICEPT) 5 MG tablet Take 1 tablet (5 mg) by mouth At Bedtime 90 tablet 3     glipiZIDE (GLUCOTROL) 10 MG tablet Take 1 tablet (10 mg) by mouth 2 times daily (before meals) 180 tablet 1     memantine (NAMENDA) 10 MG tablet TAKE 1 TABLET TWICE DAILY 180 tablet 1      metFORMIN (GLUCOPHAGE) 1000 MG tablet TAKE 1 TABLET TWICE DAILY WITH MEALS 180 tablet 1     metoprolol tartrate (LOPRESSOR) 25 MG tablet TAKE 1 TABLET TWICE DAILY 180 tablet 1     MULTIVITAMINS OR TABS   1 tab daily       nicotine (NICODERM CQ) 14 MG/24HR 24 hr patch Place 1 patch onto the skin every 24 hours 28 patch 3     simvastatin (ZOCOR) 20 MG tablet TAKE 1 TABLET AT BEDTIME 90 tablet 1     blood glucose monitoring (ACCU-CHEK YVETTE) test strip test twice a day or as directed and lancets (Patient not taking: Reported on 6/9/2017) 3 Box 3     blood glucose monitoring (ACCU-CHEK COMPACT CARE KIT) meter device kit Use to test blood sugars 3 times daily or as directed. (Patient not taking: Reported on 6/9/2017) 1 kit 0     blood glucose monitoring (ACCU-CHEK FASTCLIX) lancets Use to test blood sugar 2 times daily or as directed. (Patient not taking: Reported on 6/9/2017) 3 Box 3     Blood Glucose Monitoring Suppl (ACCU-CHEK COMPLETE) KIT 1 Device 2 times daily (Patient not taking: Reported on 6/9/2017) 1 Device 0     calcium carbonate antacid (PX ANTACID MAXIMUM STRENGTH) 1000 MG CHEW        Allergies   Allergen Reactions     No Known Drug Allergies      Recent Labs   Lab Test 01/16/20  1522 09/11/19  0952 04/26/19  1005 10/05/18  1018 02/09/18  1104  11/17/16  0937  12/29/15   A1C 8.4* 9.2* 8.7* 7.9* 7.8*   < > 6.6*   < >  --    LDL  --   --  Cannot estimate LDL when triglyceride exceeds 400 mg/dL  62  --  Cannot estimate LDL when triglyceride exceeds 400 mg/dL  64  --  47  --   --    HDL  --   --  30*  --  32*  --  31*  --   --    TRIG  --   --  596*  --  670*  --  389*  --   --    ALT  --   --   --   --   --   --   --   --  22   CR  --  1.19  --  1.11  --    < >  --   --  1.15   GFRESTIMATED  --  57*  --  64  --    < >  --   --   --    GFRESTBLACK  --  66  --  77  --    < >  --   --   --    POTASSIUM  --  4.6  --   --   --   --   --   --  4.2   TSH  --   --  2.59  --   --   --  2.24  --   --     < > = values  "in this interval not displayed.          Review of Systems   Genitourinary: Positive for impotence.   Psychiatric/Behavioral: Positive for mood changes.         OBJECTIVE:   /76 (BP Location: Right arm, Patient Position: Chair, Cuff Size: Adult Regular)   Pulse 54   Temp 98.3  F (36.8  C) (Temporal)   Resp 16   Ht 1.816 m (5' 11.5\")   Wt 84 kg (185 lb 4 oz)   SpO2 99%   BMI 25.48 kg/m   Estimated body mass index is 25.48 kg/m  as calculated from the following:    Height as of this encounter: 1.816 m (5' 11.5\").    Weight as of this encounter: 84 kg (185 lb 4 oz).  Physical Exam  GENERAL: healthy, alert, no distress, elderly and dementia  NECK: no adenopathy, no asymmetry, masses, or scars and thyroid normal to palpation  RESP: lungs clear to auscultation - no rales, rhonchi or wheezes  CV: regular rates and rhythm, normal S1 S2, no S3 or S4, grade 1/6 SE murmur heard best over the aortic, peripheral pulses strong and no peripheral edema  ABDOMEN: soft, nontender, no hepatosplenomegaly, no masses and bowel sounds normal  MS: no gross musculoskeletal defects noted, no edema  PSYCH: affect normal/bright, judgement and insight impaired, appearance well groomed and pleasant    Diagnostic Test Results:  Labs reviewed in Epic    ASSESSMENT / PLAN:       ICD-10-CM    1. Type 2 diabetes mellitus with complication (H)  E11.8 Basic metabolic panel  (Ca, Cl, CO2, Creat, Gluc, K, Na, BUN)     Albumin Random Urine Quantitative with Creat Ratio     Lipid panel reflex to direct LDL Fasting     Hemoglobin A1c     LDL cholesterol direct   2. Hyperlipidemia LDL goal <100  E78.5 Lipid panel reflex to direct LDL Fasting     simvastatin (ZOCOR) 20 MG tablet   3. Hypertension goal BP (blood pressure) < 140/90  I10 Basic metabolic panel  (Ca, Cl, CO2, Creat, Gluc, K, Na, BUN)     Albumin Random Urine Quantitative with Creat Ratio   4. Need for prophylactic vaccination and inoculation against influenza  Z23 FLUZONE HIGH DOSE " "65+  [14525]   5. Encounter for Medicare annual wellness exam  Z00.00    6. Type 2 diabetes mellitus with complication, without long-term current use of insulin (H)  E11.8 simvastatin (ZOCOR) 20 MG tablet     metFORMIN (GLUCOPHAGE) 1000 MG tablet   7. 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 Postsurgical aortocoronary bypass status  Z95.1 metoprolol tartrate (LOPRESSOR) 25 MG tablet   9. Type 2 diabetes mellitus with hyperglycemia, without long-term current use of insulin (H)  E11.65 glipiZIDE (GLUCOTROL) 10 MG tablet   10. Late onset Alzheimer's disease without behavioral disturbance (H)  G30.1 memantine (NAMENDA) 10 MG tablet    F02.80 donepezil (ARICEPT) 5 MG tablet       Patient has been advised of split billing requirements and indicates understanding: Yes  COUNSELING:  Reviewed preventive health counseling, as reflected in patient instructions       Regular exercise       Healthy diet/nutrition       Fall risk prevention       Colon cancer screening       Progressive dementia, reviewed availble support options for patient and wife Sadie    Estimated body mass index is 25.48 kg/m  as calculated from the following:    Height as of this encounter: 1.816 m (5' 11.5\").    Weight as of this encounter: 84 kg (185 lb 4 oz).        He reports that he has been smoking cigarettes. He has a 12.00 pack-year smoking history. He has never used smokeless tobacco.  Tobacco Cessation Action Plan:   Information offered: Patient not interested at this time      Appropriate preventive services were discussed with this patient, including applicable screening as appropriate for cardiovascular disease, diabetes, osteopenia/osteoporosis, and glaucoma.  As appropriate for age/gender, discussed screening for colorectal cancer, prostate cancer, breast cancer, and cervical cancer. Checklist reviewing preventive services available has been given to the " patient.    Reviewed patients plan of care and provided an AVS. The Basic Care Plan (routine screening as documented in Health Maintenance) for Parker meets the Care Plan requirement. This Care Plan has been established and reviewed with the Patient and spouse.    Counseling Resources:  ATP IV Guidelines  Pooled Cohorts Equation Calculator  Breast Cancer Risk Calculator  Breast Cancer: Medication to Reduce Risk  FRAX Risk Assessment  ICSI Preventive Guidelines  Dietary Guidelines for Americans, 2010  [employer]'s MyPlate  ASA Prophylaxis  Lung CA Screening    Kasi Craig MD  [facility]    Identified Health Risks:  Answers for HPI/ROS submitted by the patient on 9/30/2020   Annual Exam:  If you checked off any problems, how difficult have these problems made it for you to do your work, take care of things at home, or get along with other people?: Extremely difficult  PHQ9 TOTAL SCORE: 14

## 2020-09-30 NOTE — PATIENT INSTRUCTIONS
Patient Education   Personalized Prevention Plan  You are due for the preventive services outlined below.  Your care team is available to assist you in scheduling these services.  If you have already completed any of these items, please share that information with your care team to update in your medical record.  Health Maintenance Due   Topic Date Due     Depression Assessment  1940     Hepatitis B Vaccine (1 of 3 - Risk 3-dose series) 09/19/1958     A1C Lab  04/16/2020     Annual Wellness Visit  04/26/2020     Cholesterol Lab  04/26/2020     Kidney Microalbumin Urine Test  04/26/2020     Diabetic Foot Exam  04/26/2020     Colorectal Cancer Screening  06/03/2020     Eye Exam  08/02/2020     Flu Vaccine (1) 09/01/2020     Basic Metabolic Panel  09/11/2020     Preventive Health Recommendations  See your health care provider every year to    Review health changes.     Discuss preventive care.      Review your medicines if your doctor has prescribed any.    Talk with your health care provider about whether you should have a test to screen for prostate cancer (PSA).    Every 3 years, have a diabetes test (fasting glucose). If you are at risk for diabetes, you should have this test more often.    Every 5 years, have a cholesterol test. Have this test more often if you are at risk for high cholesterol or heart disease.     Every 10 years, have a colonoscopy. Or, have a yearly FIT test (stool test). These exams will check for colon cancer.    Talk to with your health care provider about screening for Abdominal Aortic Aneurysm if you have a family history of AAA or have a history of smoking.    Shots:     Get a flu shot each year.     Get a tetanus shot every 10 years.     Talk to your doctor about your pneumonia vaccines. There are now two you should receive - Pneumovax (PPSV 23) and Prevnar (PCV 13).    Talk to your pharmacist about a shingles vaccine.     Talk to your doctor about the hepatitis B  vaccine.    Nutrition:     Eat at least 5 servings of fruits and vegetables each day.     Eat whole-grain bread, whole-wheat pasta and brown rice instead of white grains and rice.     Get adequate Calcium and Vitamin D.     Lifestyle    Exercise for at least 150 minutes a week (30 minutes a day, 5 days a week). This will help you control your weight and prevent disease.     Limit alcohol to one drink per day.     No smoking.     Wear sunscreen to prevent skin cancer.     See your dentist every six months for an exam and cleaning.     See your eye doctor every 1 to 2 years to screen for conditions such as glaucoma, macular degeneration and cataracts.    Personalized Prevention Plan  You are due for the preventive services outlined below.  Your care team is available to assist you in scheduling these services.  If you have already completed any of these items, please share that information with your care team to update in your medical record.  Health Maintenance   Topic Date Due     PHQ-9  1940     HEPATITIS B IMMUNIZATION (1 of 3 - Risk 3-dose series) 09/19/1958     A1C  04/16/2020     MEDICARE ANNUAL WELLNESS VISIT  04/26/2020     LIPID  04/26/2020     MICROALBUMIN  04/26/2020     DIABETIC FOOT EXAM  04/26/2020     COLORECTAL CANCER SCREENING  06/03/2020     EYE EXAM  08/02/2020     INFLUENZA VACCINE (1) 09/01/2020     BMP  09/11/2020     FALL RISK ASSESSMENT  09/30/2021     ADVANCE CARE PLANNING  02/09/2023     DTAP/TDAP/TD IMMUNIZATION (2 - Td) 01/09/2027     PNEUMOCOCCAL IMMUNIZATION 65+ LOW/MEDIUM RISK  Completed     ZOSTER IMMUNIZATION  Completed     IPV IMMUNIZATION  Aged Out     MENINGITIS IMMUNIZATION  Aged Out       Exercise for a Healthier Heart     Exercise with a friend. When activity is fun, you're more likely to stick with it.   You may wonder how you can improve the health of your heart. If you re thinking about exercise, you re on the right track. You don t need to become an athlete, but you do  need a certain amount of brisk exercise to help strengthen your heart. If you have been diagnosed with a heart condition, your doctor may recommend exercise to help stabilize your condition. To help make exercise a habit, choose safe, fun activities.  Be sure to check with your healthcare provider before starting an exercise program.  Why exercise?  Exercising regularly offers many healthy rewards. It can help you do all of the following:    Improve your blood cholesterol level to help prevent further heart trouble    Lower your blood pressure to help prevent a stroke or heart attack    Control diabetes, or reduce your risk of getting this disease    Improve your heart and lung function    Reach and maintain a healthy weight    Make your muscles stronger and more limber so you can stay active    Prevent falls and fractures by slowing the loss of bone mass (osteoporosis)    Manage stress better    Reduce your blood pressure    Improve your sense of self and your body image  Exercise tips  Ease into your routine. Set small goals. Then build on them.  Exercise on most days. Aim for a total of 150 or more minutes of moderate to  vigorous intensity activity each week. Consider 40 minutes, 3 to 4 times a week. For best results, activity should last for 40 minutes on average. It is OK to work up to the 40 minute period over time. Examples of moderate-intensity activity is walking 1 mile in 15 minutes or 30 to 45 minutes of yard work.  Step up your daily activity level. Along with your exercise program, try being more active throughout the day. Walk instead of drive. Do more household tasks or yard work.  Choose one or more activities you enjoy. Walking is one of the easiest things you can do. You can also try swimming, riding a bike, dancing, or taking an exercise class.  Stop exercising and call your doctor if you:    Have chest pain or feel dizzy or lightheaded    Feel burning, tightness, pressure, or heaviness in your  chest, neck, shoulders, back, or arms    Have unusual shortness of breath    Have increased joint or muscle pain    Have palpitations or an irregular heartbeat  Date Last Reviewed: 5/1/2016 2000-2019 [employer]. [address] [place] [address], [place] [address]. All rights reserved. This information is not intended as a substitute for professional medical care. Always follow your healthcare professional's instructions.          Understanding USDA MyPlate  The USDA (U.S. Department of Agriculture) has guidelines to help you make healthy food choices. These are called MyPlate. MyPlate shows the food groups that make up healthy meals using the image of a place setting. Before you eat, think about the healthiest choices for what to put onto your plate or into your cup or bowl. To learn more about building a healthy plate, visit www.choosemyplate.gov.    The food groups    Fruits. Any fruit or 100% fruit juice counts as part of the Fruit Group. Fruits may be fresh, canned, frozen, or dried, and may be whole, cut-up, or pureed. Make half your plate fruits and vegetables.    Vegetables. Any vegetable or 100% vegetable juice counts as a member of the Vegetable Group. Vegetables may be fresh, frozen, canned, or dried. They can be served raw or cooked and may be whole, cut-up, or mashed. Make half your plate fruits and vegetables.    Grains. All foods made from grains are part of the Grains Group. These include wheat, rice, oats, cornmeal, and barley such as bread, pasta, oatmeal, cereal, tortillas, and grits. Grains should be no more than a quarter of your plate. At least half of your grains should be whole grains.    Protein. This group includes meat, poultry, seafood, beans and peas, eggs, processed soy products (like tofu), nuts (including nut butters), and seeds. Make protein choices no more than a quarter of your plate. Meat and poultry choices should be lean or low fat.    Dairy. All fluid milk products and  foods made from milk that contain calcium, like yogurt and cheese, are part of the Dairy Group. (Foods that have little calcium, such as cream, butter, and cream cheese, are not part of the group.) Most dairy choices should be low-fat or fat-free.    Oils. These are fats that are liquid at room temperature. They include canola, corn, olive, soybean, and sunflower oil. Foods that are mainly oil include mayonnaise, certain salad dressings, and soft margarines. You should have only 5 to 7 teaspoons of oils a day. You probably already get this much from the food you eat.  Date Last Reviewed: 8/1/2017 2000-2019 [employer]. [address] [place] [address], [place] [address]. All rights reserved. This information is not intended as a substitute for professional medical care. Always follow your healthcare professional's instructions.        Activities of Daily Living    Your Health Risk Assessment indicates you have difficulties with activities of daily living such as housework, bathing, preparing meals, taking medication, etc. Please make a follow up appointment for us to address this issue in more detail.    Signs of Hearing Loss     Hearing much better with one ear can be a sign of hearing loss.     Hearing loss is a problem shared by many people. In fact, it is one of the most common health conditions, particularly as people age. Most people over age 65 have some hearing loss, and by age 80, almost everyone does. Because hearing loss usually occurs slowly over the years, you may not realize your hearing ability has gotten worse.  Have your hearing checked  Contact your healthcare provider if you:    Have to strain to hear normal conversation    Have to watch other people s faces very carefully to follow what they re saying    Need to ask people to repeat what they ve said    Often misunderstand what people are saying    Turn the volume of the television or radio up so high that others complain    Feel that  people are mumbling when they re talking to you    Find that the effort to hear leaves you feeling tired and irritated    Notice, when using the phone, that you hear better with one ear than the other  Date Last Reviewed: 12/1/2016 2000-2019 [employer]. [address] [place] [address]. All rights reserved. This information is not intended as a substitute for professional medical care. Always follow your healthcare professional's instructions.        Your Health Risk Assessment indicates you feel you are not in good emotional health.    Recreation   Recreation is not limited to sports and team events. It includes any activity that provides relaxation, interest, enjoyment, and exercise. Recreation provides an outlet for physical, mental, and social energy. It can give a sense of worth and achievement. It can help you stay healthy.    Mental Exercise and Social Involvement  Mental and emotional health is as important as physical health. Keep in touch with friends and family. Stay as active as possible. Continue to learn and challenge yourself.   Things you can do to stay mentally active are:    Learn something new, like a foreign language or musical instrument.     Play SCRABBLE or do crossword puzzles. If you cannot find people to play these games with you at home, you can play them with others on your computer through the Internet.     Join a games club--anything from card games to chess or checkers or lawn bowling.     Start a new hobby.     Go back to school.     Volunteer.     Read.   Keep up with world events.

## 2020-10-01 ASSESSMENT — PATIENT HEALTH QUESTIONNAIRE - PHQ9: SUM OF ALL RESPONSES TO PHQ QUESTIONS 1-9: 14

## 2020-11-28 ENCOUNTER — MYC MEDICAL ADVICE (OUTPATIENT)
Dept: FAMILY MEDICINE | Facility: CLINIC | Age: 80
End: 2020-11-28

## 2021-01-15 ENCOUNTER — HEALTH MAINTENANCE LETTER (OUTPATIENT)
Age: 81
End: 2021-01-15

## 2021-01-19 ENCOUNTER — MYC MEDICAL ADVICE (OUTPATIENT)
Dept: FAMILY MEDICINE | Facility: CLINIC | Age: 81
End: 2021-01-19

## 2021-05-26 DIAGNOSIS — E11.65 TYPE 2 DIABETES MELLITUS WITH HYPERGLYCEMIA, WITHOUT LONG-TERM CURRENT USE OF INSULIN (H): ICD-10-CM

## 2021-05-28 RX ORDER — GLIPIZIDE 10 MG/1
10 TABLET ORAL
Qty: 180 TABLET | Refills: 3 | OUTPATIENT
Start: 2021-05-28

## 2021-05-30 ENCOUNTER — HEALTH MAINTENANCE LETTER (OUTPATIENT)
Age: 81
End: 2021-05-30

## 2021-09-19 ENCOUNTER — HEALTH MAINTENANCE LETTER (OUTPATIENT)
Age: 81
End: 2021-09-19

## 2021-10-04 DIAGNOSIS — F02.80 LATE ONSET ALZHEIMER'S DISEASE WITHOUT BEHAVIORAL DISTURBANCE (H): ICD-10-CM

## 2021-10-04 DIAGNOSIS — G30.1 LATE ONSET ALZHEIMER'S DISEASE WITHOUT BEHAVIORAL DISTURBANCE (H): ICD-10-CM

## 2021-10-07 RX ORDER — DONEPEZIL HYDROCHLORIDE 5 MG/1
5 TABLET, FILM COATED ORAL AT BEDTIME
Qty: 90 TABLET | Refills: 0 | Status: SHIPPED | OUTPATIENT
Start: 2021-10-07 | End: 2021-12-03

## 2021-11-14 ENCOUNTER — HEALTH MAINTENANCE LETTER (OUTPATIENT)
Age: 81
End: 2021-11-14

## 2021-11-30 DIAGNOSIS — G30.1 LATE ONSET ALZHEIMER'S DISEASE WITHOUT BEHAVIORAL DISTURBANCE (H): ICD-10-CM

## 2021-11-30 DIAGNOSIS — F02.80 LATE ONSET ALZHEIMER'S DISEASE WITHOUT BEHAVIORAL DISTURBANCE (H): ICD-10-CM

## 2021-12-02 NOTE — TELEPHONE ENCOUNTER
Pending Prescriptions:                       Disp   Refills    donepezil (ARICEPT) 5 MG tablet [Pharmacy *90 tab*0        Sig: TAKE 1 TABLET AT BEDTIME (NEED MD APPOINTMENT)      Routing refill request to provider for review/approval because:  Leigh given x1 and patient did not follow up, please advise    Le Ye, RN on 12/2/2021 at 8:10 AM

## 2021-12-03 RX ORDER — DONEPEZIL HYDROCHLORIDE 5 MG/1
TABLET, FILM COATED ORAL
Qty: 90 TABLET | Refills: 0 | Status: SHIPPED | OUTPATIENT
Start: 2021-12-03

## 2022-01-01 ENCOUNTER — HEALTH MAINTENANCE LETTER (OUTPATIENT)
Age: 82
End: 2022-01-01

## 2022-01-09 ENCOUNTER — HEALTH MAINTENANCE LETTER (OUTPATIENT)
Age: 82
End: 2022-01-09

## 2022-05-01 ENCOUNTER — HEALTH MAINTENANCE LETTER (OUTPATIENT)
Age: 82
End: 2022-05-01

## 2022-08-21 ENCOUNTER — HEALTH MAINTENANCE LETTER (OUTPATIENT)
Age: 82
End: 2022-08-21

## 2022-11-21 ENCOUNTER — HEALTH MAINTENANCE LETTER (OUTPATIENT)
Age: 82
End: 2022-11-21

## 2023-01-01 ENCOUNTER — HEALTH MAINTENANCE LETTER (OUTPATIENT)
Age: 83
End: 2023-01-01

## 2024-10-10 NOTE — PROGRESS NOTES
"Parker Ricks is a 79 year old male who is being evaluated via a billable telephone visit.      The patient has been notified of following:     \"This telephone visit will be conducted via a call between you and your physician/provider. We have found that certain health care needs can be provided without the need for a physical exam.  This service lets us provide the care you need with a short phone conversation.  If a prescription is necessary we can send it directly to your pharmacy.  If lab work is needed we can place an order for that and you can then stop by our lab to have the test done at a later time.    Telephone visits are billed at different rates depending on your insurance coverage. During this emergency period, for some insurers they may be billed the same as an in-person visit.  Please reach out to your insurance provider with any questions.    If during the course of the call the physician/provider feels a telephone visit is not appropriate, you will not be charged for this service.\"    Patient has given verbal consent for Telephone visit?  Yes    How would you like to obtain your AVS? Adriennehart    Subjective     Parker Ricks is a 79 year old male who presents to clinic today for the following health issues:    Diabetes Follow-up      How often are you checking your blood sugar? Not at all    What concerns do you have today about your diabetes? None     Do you have any of these symptoms? (Select all that apply)  No numbness or tingling in feet.  No redness, sores or blisters on feet.  No complaints of excessive thirst.  No reports of blurry vision.  No significant changes to weight.      BP Readings from Last 2 Encounters:   01/16/20 128/80   09/11/19 122/68     Hemoglobin A1C (%)   Date Value   01/16/2020 8.4 (H)   09/11/2019 9.2 (H)     LDL Cholesterol Calculated (mg/dL)   Date Value   04/26/2019     Cannot estimate LDL when triglyceride exceeds 400 mg/dL   02/09/2018     Cannot " estimate LDL when triglyceride exceeds 400 mg/dL     LDL Cholesterol Direct (mg/dL)   Date Value   04/26/2019 62   02/09/2018 64         Vascular Disease Follow up       How many servings of fruits and vegetables do you eat daily?  2-3    On average, how many sweetened beverages do you drink each day (Examples: soda, juice, sweet tea, etc.  Do NOT count diet or artificially sweetened beverages)?   0    How many days per week do you exercise enough to make your heart beat faster? none    How many minutes a day do you exercise enough to make your heart beat faster? none    How many days per week do you miss taking your medication? 0     Being weaned off of clonazepam and currently taking one tablet a day with metformin till they get home.            Patient Active Problem List   Diagnosis     Essential and other specified forms of tremor     Orchitis and epididymitis     Coronary atherosclerosis     History of colonic polyps     Diverticulosis of large intestine     Type 2 diabetes mellitus with complication (H)     Hyperlipidemia LDL goal <100     Anxiety     Hypertension goal BP (blood pressure) < 140/90     Advanced directives, counseling/discussion     Tobacco abuse     Gastroesophageal reflux disease without esophagitis     Coronary atherosclerosis     Acute posthemorrhagic anemia     Dementia without behavioral disturbance (H)     Myocardial infarction, nontransmural (H)     Postsurgical aortocoronary bypass status     Other secondary thrombocytopenia     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)     Late onset Alzheimer's disease without behavioral disturbance (H)     Past Surgical History:   Procedure Laterality Date     C NONSPECIFIC PROCEDURE  1995    Angioplasty with stent placement     HC COLONOSCOPY W BIOPSY  03/03/10    repeat in 5 yrs      COLONOSCOPY W/WO BRUSH/WASH  1995    Colonoscopy with polypectomy       Social History     Tobacco Use     Smoking status:  Current Every Day Smoker     Packs/day: 0.30     Years: 40.00     Pack years: 12.00     Types: Cigarettes     Smokeless tobacco: Never Used     Tobacco comment: 3 cigarettes a day   Substance Use Topics     Alcohol use: No     Family History   Problem Relation Age of Onset     Gastrointestinal Disease Mother         hiatal hernia     Neurologic Disorder Mother         tremor     Arthritis Father      Heart Disease Father         father  at age 84 of a MI that occurred while having knee replacements     Gastrointestinal Disease Brother      Cancer Sister         sister  of ovarian cancer in her 60's         Current Outpatient Medications   Medication Sig Dispense Refill     ACE NOT PRESCRIBED, INTENTIONAL, by Other route continuous prn.  0     aspirin EC 81 MG tablet Take 1 tablet (81 mg) by mouth daily       calcium carbonate antacid (PX ANTACID MAXIMUM STRENGTH) 1000 MG CHEW        calcium carbonate-vitamin D (CALCIUM + D) 600-200 MG-UNIT TABS Take 1 tablet by mouth daily. 60 tablet      clonazePAM (KLONOPIN) 0.5 MG tablet Take 0.5 tablets (0.25 mg) by mouth daily 15 tablet 0     COCONUT OIL PO        donepezil (ARICEPT) 5 MG tablet Take 1 tablet (5 mg) by mouth At Bedtime 90 tablet 3     glipiZIDE (GLUCOTROL) 10 MG tablet Take 1 tablet (10 mg) by mouth 2 times daily (before meals) 180 tablet 1     memantine (NAMENDA) 10 MG tablet TAKE 1 TABLET TWICE DAILY 180 tablet 1     metFORMIN (GLUCOPHAGE) 1000 MG tablet TAKE 1 TABLET TWICE DAILY WITH MEALS 180 tablet 1     metoprolol tartrate (LOPRESSOR) 25 MG tablet TAKE 1 TABLET TWICE DAILY 180 tablet 1     MULTIVITAMINS OR TABS   1 tab daily       simvastatin (ZOCOR) 20 MG tablet TAKE 1 TABLET AT BEDTIME 90 tablet 1     B Complex Vitamins (VITAMIN-B COMPLEX) TABS Take 1 tablet by mouth daily       blood glucose monitoring (ACCU-CHEK YVETTE) test strip test twice a day or as directed and lancets (Patient not taking: Reported on 2017) 3 Box 3     blood glucose  Heidi Du(Resident) monitoring (ACCU-CHEK COMPACT CARE KIT) meter device kit Use to test blood sugars 3 times daily or as directed. (Patient not taking: Reported on 6/9/2017) 1 kit 0     blood glucose monitoring (ACCU-CHEK FASTCLIX) lancets Use to test blood sugar 2 times daily or as directed. (Patient not taking: Reported on 6/9/2017) 3 Box 3     Blood Glucose Monitoring Suppl (ACCU-CHEK COMPLETE) KIT 1 Device 2 times daily (Patient not taking: Reported on 6/9/2017) 1 Device 0     Allergies   Allergen Reactions     No Known Drug Allergies      Recent Labs   Lab Test 01/16/20  1522 09/11/19  0952 04/26/19  1005 10/05/18  1018 02/09/18  1104  11/17/16  0937  12/29/15   A1C 8.4* 9.2* 8.7* 7.9* 7.8*   < > 6.6*   < >  --    LDL  --   --  Cannot estimate LDL when triglyceride exceeds 400 mg/dL  62  --  Cannot estimate LDL when triglyceride exceeds 400 mg/dL  64  --  47  --   --    HDL  --   --  30*  --  32*  --  31*  --   --    TRIG  --   --  596*  --  670*  --  389*  --   --    ALT  --   --   --   --   --   --   --   --  22   CR  --  1.19  --  1.11  --    < >  --   --  1.15   GFRESTIMATED  --  57*  --  64  --    < >  --   --   --    GFRESTBLACK  --  66  --  77  --    < >  --   --   --    POTASSIUM  --  4.6  --   --   --   --   --   --  4.2   TSH  --   --  2.59  --   --   --  2.24  --   --     < > = values in this interval not displayed.      BP Readings from Last 3 Encounters:   01/16/20 128/80   09/11/19 122/68   04/26/19 126/70    Wt Readings from Last 3 Encounters:   01/16/20 86.8 kg (191 lb 5 oz)   09/11/19 88 kg (194 lb 1 oz)   04/26/19 87.6 kg (193 lb 3 oz)                    Reviewed and updated as needed this visit by Provider  Tobacco  Allergies  Meds  Problems  Med Hx  Surg Hx  Fam Hx         Review of Systems   ROS COMP: CONSTITUTIONAL: NEGATIVE for fever, chills, change in weight  ENT/MOUTH: NEGATIVE for ear, mouth and throat problems  RESP: NEGATIVE for significant cough or SOB  CV: NEGATIVE for chest pain,  palpitations or peripheral edema  ROS otherwise negative       Objective   Reported vitals:  There were no vitals taken for this visit.   healthy, alert and no distress  PSYCH: Alert and oriented times 3; coherent speech, normal   rate and volume, able to articulate logical thoughts, able   to abstract reason, no tangential thoughts, no hallucinations   or delusions  His affect is flat  RESP: No cough, no audible wheezing, able to talk in full sentences  Remainder of exam unable to be completed due to telephone visits    Diagnostic Test Results:  Labs reviewed in Epic        Assessment/Plan:  1. Type 2 diabetes mellitus with complication, without long-term current use of insulin (H)  Chronic stable. Will obtain refill from [facility] prior to leaving [place] to continue twice daily dosing of Metformin. Recheck labs in 6 weeks when home from [place] and have completed 14 day quarantine    2. 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 The current medical regimen is effective;  continue present plan and medications.     3. Late onset Alzheimer's disease without behavioral disturbance (H)  Chronic stable. Continue current cares.     4. Anxiety  Chronic stable after having weaned clonazepam. The current medical regimen is effective;  continue present plan and medications.       Return in about 6 weeks (around 5/25/2020) for Lab Work, Routine Visit.      Phone call duration:  12 minutes    Kasi Craig MD